# Patient Record
Sex: FEMALE | Race: WHITE | NOT HISPANIC OR LATINO | Employment: OTHER | URBAN - METROPOLITAN AREA
[De-identification: names, ages, dates, MRNs, and addresses within clinical notes are randomized per-mention and may not be internally consistent; named-entity substitution may affect disease eponyms.]

---

## 2017-05-09 ENCOUNTER — HOSPITAL ENCOUNTER (OUTPATIENT)
Dept: SLEEP MEDICINE | Facility: HOSPITAL | Age: 72
Discharge: HOME | End: 2017-05-09
Attending: FAMILY MEDICINE
Payer: COMMERCIAL

## 2017-05-09 PROCEDURE — 95806 SLEEP STUDY UNATT&RESP EFFT: CPT

## 2017-11-16 ENCOUNTER — TELEPHONE (OUTPATIENT)
Dept: SURGERY | Facility: CLINIC | Age: 72
End: 2017-11-16

## 2017-11-16 NOTE — TELEPHONE ENCOUNTER
Received call from [facility] regarding discs. They will mail today. Will send to radiology when they arrive.

## 2017-11-16 NOTE — TELEPHONE ENCOUNTER
Patient returned my call, discussed referral from [facility] and our abnormal mammogram protocol. Patient would like mammograms reviewed. Patient states the mammogram office told her they'd mail her images if she needed them to, she will call them and ask them to call Gabriel to get address. Explained that once we receive the images our radiologist will review the discs and be in contact with their recommendations. Also explained that I will call patient to update her once the disc is received and sent for review. Verbalized understanding of all information.

## 2017-11-21 ENCOUNTER — TELEPHONE (OUTPATIENT)
Dept: HEMATOLOGY/ONCOLOGY | Facility: CLINIC | Age: 72
End: 2017-11-21

## 2017-11-21 ENCOUNTER — HOSPITAL ENCOUNTER (OUTPATIENT)
Dept: RADIOLOGY | Facility: HOSPITAL | Age: 72
Discharge: HOME | End: 2017-11-21
Attending: NURSE PRACTITIONER

## 2017-11-21 NOTE — TELEPHONE ENCOUNTER
Called patient to let her know I received her images on disc, will bring them to radiology today for review. Also provided patient with insurance line information so she can get insurance added to system.

## 2017-11-22 ENCOUNTER — TELEPHONE (OUTPATIENT)
Dept: RADIOLOGY | Facility: HOSPITAL | Age: 72
End: 2017-11-22

## 2017-11-22 NOTE — TELEPHONE ENCOUNTER
Spoke with patient. Reviewed breast biopsy procedure and reviewed instructions for breast biopsy. Patient expressed understanding and all questions were answered. Provided patient with my phone number to call for any further concerns or questions.   Patient scheduled breast biopsy at the [facility] on 12/6/17

## 2017-12-06 ENCOUNTER — OFFICE VISIT (OUTPATIENT)
Dept: SURGERY | Facility: CLINIC | Age: 72
End: 2017-12-06
Payer: MEDICARE

## 2017-12-06 ENCOUNTER — HOSPITAL ENCOUNTER (OUTPATIENT)
Dept: RADIOLOGY | Facility: HOSPITAL | Age: 72
Discharge: HOME | End: 2017-12-06
Attending: SURGERY
Payer: MEDICARE

## 2017-12-06 VITALS
BODY MASS INDEX: 27.12 KG/M2 | HEIGHT: 59 IN | HEART RATE: 76 BPM | SYSTOLIC BLOOD PRESSURE: 177 MMHG | DIASTOLIC BLOOD PRESSURE: 79 MMHG | TEMPERATURE: 98 F | WEIGHT: 134.5 LBS

## 2017-12-06 DIAGNOSIS — R92.8 ABNORMAL MAMMOGRAM: Primary | ICD-10-CM

## 2017-12-06 DIAGNOSIS — R92.8 ABNORMAL MAMMOGRAM OF RIGHT BREAST: Primary | ICD-10-CM

## 2017-12-06 DIAGNOSIS — N63.0 BREAST MASS: ICD-10-CM

## 2017-12-06 DIAGNOSIS — R92.8 ABNORMAL MAMMOGRAM OF RIGHT BREAST: ICD-10-CM

## 2017-12-06 DIAGNOSIS — R92.8 ABNORMAL MAMMOGRAM: ICD-10-CM

## 2017-12-06 PROCEDURE — 88342 IMHCHEM/IMCYTCHM 1ST ANTB: CPT | Mod: 26,59,, | Performed by: PATHOLOGY

## 2017-12-06 PROCEDURE — 99999 PR PBB SHADOW E&M-EST. PATIENT-LVL III: CPT | Mod: PBBFAC,,, | Performed by: SURGERY

## 2017-12-06 PROCEDURE — 76642 ULTRASOUND BREAST LIMITED: CPT | Mod: TC,PO,RT

## 2017-12-06 PROCEDURE — 99203 OFFICE O/P NEW LOW 30 MIN: CPT | Mod: S$GLB,,, | Performed by: SURGERY

## 2017-12-06 PROCEDURE — 76642 ULTRASOUND BREAST LIMITED: CPT | Mod: 26,RT,, | Performed by: STUDENT IN AN ORGANIZED HEALTH CARE EDUCATION/TRAINING PROGRAM

## 2017-12-06 PROCEDURE — 77065 DX MAMMO INCL CAD UNI: CPT | Mod: TC,PO,RT

## 2017-12-06 PROCEDURE — 88360 TUMOR IMMUNOHISTOCHEM/MANUAL: CPT | Mod: 26,,, | Performed by: PATHOLOGY

## 2017-12-06 PROCEDURE — 38505 NEEDLE BIOPSY LYMPH NODES: CPT | Mod: ,,, | Performed by: STUDENT IN AN ORGANIZED HEALTH CARE EDUCATION/TRAINING PROGRAM

## 2017-12-06 PROCEDURE — A4648 IMPLANTABLE TISSUE MARKER: HCPCS | Mod: PO

## 2017-12-06 PROCEDURE — 88360 TUMOR IMMUNOHISTOCHEM/MANUAL: CPT | Performed by: PATHOLOGY

## 2017-12-06 PROCEDURE — 19081 BX BREAST 1ST LESION STRTCTC: CPT | Mod: 26,RT,, | Performed by: STUDENT IN AN ORGANIZED HEALTH CARE EDUCATION/TRAINING PROGRAM

## 2017-12-06 PROCEDURE — 88305 TISSUE EXAM BY PATHOLOGIST: CPT | Performed by: PATHOLOGY

## 2017-12-06 PROCEDURE — 27201068 US BIOPSY LYMPH NODE AXILLA: Mod: PO

## 2017-12-06 PROCEDURE — 77065 DX MAMMO INCL CAD UNI: CPT | Mod: 26,RT,, | Performed by: STUDENT IN AN ORGANIZED HEALTH CARE EDUCATION/TRAINING PROGRAM

## 2017-12-06 PROCEDURE — 88305 TISSUE EXAM BY PATHOLOGIST: CPT | Mod: 26,,, | Performed by: PATHOLOGY

## 2017-12-06 RX ORDER — TRAZODONE HYDROCHLORIDE 100 MG/1
1 TABLET ORAL NIGHTLY
COMMUNITY
Start: 2017-11-04 | End: 2018-02-07

## 2017-12-06 RX ORDER — CLONAZEPAM 2 MG/1
1 TABLET ORAL NIGHTLY
COMMUNITY
Start: 2017-11-29 | End: 2018-02-07

## 2017-12-06 RX ORDER — LEVOTHYROXINE SODIUM 112 UG/1
1 TABLET ORAL DAILY
COMMUNITY
Start: 2017-10-20 | End: 2020-08-03

## 2017-12-06 RX ORDER — LOSARTAN POTASSIUM 100 MG/1
1 TABLET ORAL NIGHTLY
COMMUNITY
Start: 2017-10-17 | End: 2020-08-03

## 2017-12-06 RX ORDER — PAROXETINE HYDROCHLORIDE 40 MG/1
1 TABLET, FILM COATED ORAL NIGHTLY
COMMUNITY
Start: 2017-10-20 | End: 2018-02-07

## 2017-12-06 NOTE — PROGRESS NOTES
Breast Surgery  [facility]  Department of Surgery      REFERRING PROVIDER: Aaareferral Self  No address on file    Chief Complaint: Consult (CBE prior to core bx)      Subjective:      Patient ID: Leigh Townsend is a 72 y.o. female who presents for clinical breast exam prior to scheduled biopsy.  She had an outside mammogram with new calcifications in a segmental distribution in the right breast upper outer quadrantthat are suspicious, BIRADS IV.  Her films have been reviewed and is scheduled for additional imaging today.    Patient does not routinely do self breast exams.  Patient has not noted a change on breast exam.  Patient denies nipple discharge. Patient admits to to previous breast biopsy, excisional biopsy x4, all benign. Patient denies a personal history of breast cancer.    GYN History:  Age of menarche was 13. Age of menopause was early 50s.  Patient denies hormonal therapy. Patient is . Age of first live birth was 21s.     Past Medical History:   Diagnosis Date    Depression     Hypertension     Hypothyroid      Past Surgical History:   Procedure Laterality Date    BREAST BIOPSY      4 excisional biopsies     SECTION       No current outpatient prescriptions on file prior to visit.     No current facility-administered medications on file prior to visit.      Social History     Social History    Marital status:      Spouse name: N/A    Number of children: N/A    Years of education: N/A     Occupational History    Not on file.     Social History Main Topics    Smoking status: Current Every Day Smoker     Packs/day: 0.50    Smokeless tobacco: Never Used    Alcohol use No    Drug use: Unknown    Sexual activity: Not on file     Other Topics Concern    Not on file     Social History Narrative    No narrative on file     Family History   Problem Relation Age of Onset    Cancer Paternal Grandmother      throat cancer    Cancer Paternal Grandfather      prostate  "cancer        Review of Systems   Constitutional: Negative for appetite change, chills, fever and unexpected weight change.   HENT: Negative for facial swelling, postnasal drip and sore throat.    Eyes: Negative for redness and itching.   Respiratory: Negative for chest tightness and shortness of breath.    Cardiovascular: Negative for chest pain and palpitations.   Gastrointestinal: Negative for blood in stool, diarrhea, nausea and vomiting.   Genitourinary: Negative for difficulty urinating and dysuria.   Musculoskeletal: Negative for arthralgias and joint swelling.   Skin: Negative for rash and wound.   Neurological: Negative for dizziness and syncope.   Hematological: Negative for adenopathy.   Psychiatric/Behavioral: Negative for agitation. The patient is not nervous/anxious.      Objective:   BP (!) 177/79 (BP Location: Right arm, Patient Position: Sitting, BP Method: Medium (Automatic))   Pulse 76   Temp 97.6 °F (36.4 °C) (Oral)   Ht 4' 11" (1.499 m)   Wt 61 kg (134 lb 7.7 oz)   BMI 27.16 kg/m²     Physical Exam   Constitutional: She appears well-developed and well-nourished.   HENT:   Head: Normocephalic.   Eyes: No scleral icterus.   Neck: Neck supple. No tracheal deviation present.   Cardiovascular: Normal rate and regular rhythm.    Pulmonary/Chest: Breath sounds normal. No respiratory distress. Right breast exhibits no inverted nipple, no mass, no nipple discharge and no skin change. Left breast exhibits no inverted nipple, no mass, no nipple discharge and no skin change.       Abdominal: Soft. She exhibits no mass. There is no tenderness.   Musculoskeletal: She exhibits no edema.   Lymphadenopathy:     She has no cervical adenopathy.   Neurological: She is alert.   Skin: No rash noted. No erythema.     Psychiatric: She has a normal mood and affect.       Radiology review: Images personally reviewed by me in the clinic.   Mammogram:  Findings:     Bilateral screening mammogram 10/25/17: The breasts " are heterogeneously dense, which may obscure small masses. There are indeterminate calcifications in the right breast. There are stable presumed post-surgical changes in both breasts with radiopaque linear scar markers on the overlying skin. No suspicious finding in the left breast.      Right breast mammogram 11/9/17: Additional evaluation with spot magnification mammography was performed for the right breast calcifications. In the right breast upper outer quadrant, fine linear and fine linear branching calcifications are present, spanning the anterior to middle depths and extending to within 1 cm of the nipple. The calcifications are segmentally distributed, measuring 6.5 x 4.7 x 4.4 cm (APxTVxCC).      Impression:  1) Right breast upper outer quadrant segmental fine linear branching calcifications spanning the anterior to middle depths and extending to within 1 cm of the nipple. Greatest linear extent of involvement is 6.5 cm. Assessment: 4C - Suspicious finding. Biopsy is recommended. Prior to biopsy, limited right breast ultrasound (including assessment of the right axilla) is recommended.     2) No suspicious finding in the left breast.     BI-RADS Category:   Overall: 4C - High Suspicion for Malignancy     Recommendation:  Right breast limited ultrasound, including assessment of the right axilla.     Single site right breast biopsy of the most suspicious area of calcifications.  Assessment:       Abnormal mammogram  Plan:     We discussed the need for biopsy today of the new calcifications in the R breast.  I explained that I do feel a subtle abnormality of the L breast that we will also image.  She has had needle biopsies before and is ready to proceed.  I explained the possible biopsy results and potential need for further intervention depending on results.  I told her I was suspicious of the imaging findings and she responded that it would be fine to just take her breast if it needed to go.  Will call with  biopsy results.   All her questions were answered.    Total time spent with the patient: 30 minutes.  20 minutes of face to face consultation and 10 minutes of chart review and coordination of care.

## 2017-12-11 ENCOUNTER — TELEPHONE (OUTPATIENT)
Dept: SURGERY | Facility: CLINIC | Age: 72
End: 2017-12-11

## 2017-12-11 NOTE — TELEPHONE ENCOUNTER
Patient returned my call, we discussed her biopsy results. Explained to patient that biopsy came back with DCIS and a negative lymph node. We discussed what these results mean, and that the next step is to discuss surgery. Patient was very happy with Dr. Mesa and would like to stay with her. Scheduled with Dr. Msea for follow up discussion on Wednesday at 2:15. Reviewed location of [facility] with patient, encouraged her to call if she has any questions or concerns or needs to reschedule. Verbalized understanding of all information.

## 2017-12-11 NOTE — TELEPHONE ENCOUNTER
Called patient to discuss biopsy results. Left voicemail with my callback number requesting return call at earliest convenience

## 2017-12-13 ENCOUNTER — OFFICE VISIT (OUTPATIENT)
Dept: SURGERY | Facility: CLINIC | Age: 72
End: 2017-12-13
Payer: MEDICARE

## 2017-12-13 ENCOUNTER — DOCUMENTATION ONLY (OUTPATIENT)
Dept: SURGERY | Facility: CLINIC | Age: 72
End: 2017-12-13

## 2017-12-13 VITALS
SYSTOLIC BLOOD PRESSURE: 159 MMHG | WEIGHT: 134.5 LBS | HEIGHT: 59 IN | BODY MASS INDEX: 27.12 KG/M2 | TEMPERATURE: 98 F | DIASTOLIC BLOOD PRESSURE: 71 MMHG | HEART RATE: 92 BPM

## 2017-12-13 DIAGNOSIS — Z17.0 MALIGNANT NEOPLASM OF OVERLAPPING SITES OF RIGHT BREAST IN FEMALE, ESTROGEN RECEPTOR POSITIVE: Primary | ICD-10-CM

## 2017-12-13 DIAGNOSIS — C50.811 MALIGNANT NEOPLASM OF OVERLAPPING SITES OF RIGHT BREAST IN FEMALE, ESTROGEN RECEPTOR POSITIVE: Primary | ICD-10-CM

## 2017-12-13 PROCEDURE — 99214 OFFICE O/P EST MOD 30 MIN: CPT | Mod: S$GLB,,, | Performed by: SURGERY

## 2017-12-13 PROCEDURE — 99999 PR PBB SHADOW E&M-EST. PATIENT-LVL III: CPT | Mod: PBBFAC,,, | Performed by: SURGERY

## 2017-12-13 NOTE — PROGRESS NOTES
Nurse Navigator Note:     Met with patient during her consult with Dr. Mesa. Patient and I reviewed the information she discussed with Dr. Mesa, including treatment options, diagnosis, and future plans for workup. Patient and I went through the new patient binder, explained some of the information and why it is provided.     Also offered patient consults with our other specialty clinics: Sarah Gibson for physical therapy evaluation, Dr. Varghese for psychological support, and Minerva Staley for nutritional counseling. Explained to patient that all of these support services are completely optional. Discussed that physical therapy is the only service that is recommended pre-op specifically, everything else can be requested at a later time. Patient was given a copy of her appointments, Dr. Mesa's card, and my card. Encouraged her to call me if she has any questions or concerns or would like to schedule any additional appointments. Verbalized understanding of all information.

## 2017-12-14 DIAGNOSIS — C50.811 MALIGNANT NEOPLASM OF OVERLAPPING SITES OF RIGHT BREAST IN FEMALE, ESTROGEN RECEPTOR NEGATIVE: Primary | ICD-10-CM

## 2017-12-14 DIAGNOSIS — Z17.1 MALIGNANT NEOPLASM OF OVERLAPPING SITES OF RIGHT BREAST IN FEMALE, ESTROGEN RECEPTOR NEGATIVE: Primary | ICD-10-CM

## 2017-12-19 NOTE — PROGRESS NOTES
Breast Surgery  [facility]  Department of Surgery      REFERRING PROVIDER: No referring provider defined for this encounter.    Chief Complaint: Consult (New DCIS)      Subjective:      Patient ID: Leigh Townsend is a 72 y.o. female who returns after biopsies with findings of DCIS with microinvasive disease in 2 separate quadrants of the breast connected by suspicious calcifications.    She initially presented for clinical breast exam prior to scheduled biopsy.  She had an outside mammogram with new calcifications in a segmental distribution in the right breast upper outer quadrantthat are suspicious, BIRADS IV.  Her films have been reviewed and is scheduled for additional imaging today.    Patient does not routinely do self breast exams.  Patient has not noted a change on breast exam.  Patient denies nipple discharge. Patient admits to to previous breast biopsy, excisional biopsy x4, all benign. Patient denies a personal history of breast cancer.    GYN History:  Age of menarche was 13. Age of menopause was early 50s.  Patient denies hormonal therapy. Patient is . Age of first live birth was 20s.     Past Medical History:   Diagnosis Date    Depression     Hypertension     Hypothyroid      Past Surgical History:   Procedure Laterality Date    BREAST BIOPSY      4 excisional biopsies     SECTION       Current Outpatient Prescriptions on File Prior to Visit   Medication Sig Dispense Refill    clonazePAM (KLONOPIN) 2 MG Tab Take 1 tablet by mouth every evening.      levothyroxine (SYNTHROID) 112 MCG tablet Take 1 tablet by mouth once daily.      losartan (COZAAR) 100 MG tablet Take 1 tablet by mouth once daily.      paroxetine (PAXIL) 40 MG tablet Take 1 tablet by mouth once daily.      traZODone (DESYREL) 100 MG tablet Take 1 tablet by mouth every evening.       No current facility-administered medications on file prior to visit.      Social History     Social History    Marital status:  "     Spouse name: N/A    Number of children: N/A    Years of education: N/A     Occupational History    Not on file.     Social History Main Topics    Smoking status: Current Every Day Smoker     Packs/day: 0.50    Smokeless tobacco: Never Used    Alcohol use No    Drug use: Unknown    Sexual activity: Not on file     Other Topics Concern    Not on file     Social History Narrative    No narrative on file     Family History   Problem Relation Age of Onset    Cancer Paternal Grandmother      throat cancer    Cancer Paternal Grandfather      prostate cancer        Review of Systems   Constitutional: Negative for appetite change, chills, fever and unexpected weight change.   HENT: Negative for facial swelling, postnasal drip and sore throat.    Eyes: Negative for redness and itching.   Respiratory: Negative for chest tightness and shortness of breath.    Cardiovascular: Negative for chest pain and palpitations.   Gastrointestinal: Negative for blood in stool, diarrhea, nausea and vomiting.   Genitourinary: Negative for difficulty urinating and dysuria.   Musculoskeletal: Negative for arthralgias and joint swelling.   Skin: Negative for rash and wound.   Neurological: Negative for dizziness and syncope.   Hematological: Negative for adenopathy.   Psychiatric/Behavioral: Negative for agitation. The patient is not nervous/anxious.      Objective:   BP (!) 159/71 (BP Location: Left arm, Patient Position: Sitting, BP Method: Medium (Automatic))   Pulse 92   Temp 97.8 °F (36.6 °C) (Oral)   Ht 4' 11" (1.499 m)   Wt 61 kg (134 lb 8 oz)   LMP  (LMP Unknown)   BMI 27.17 kg/m²     Physical Exam   Constitutional: She appears well-developed and well-nourished.   HENT:   Head: Normocephalic.   Eyes: No scleral icterus.   Neck: Neck supple. No tracheal deviation present.   Cardiovascular: Normal rate and regular rhythm.    Pulmonary/Chest: Breath sounds normal. No respiratory distress. Right breast exhibits " no inverted nipple, no mass, no nipple discharge and no skin change. Left breast exhibits no inverted nipple, no mass, no nipple discharge and no skin change.       Abdominal: Soft. She exhibits no mass. There is no tenderness.   Musculoskeletal: She exhibits no edema.   Lymphadenopathy:     She has no cervical adenopathy.   Neurological: She is alert.   Skin: No rash noted. No erythema.     Psychiatric: She has a normal mood and affect.       Radiology review: Images personally reviewed by me in the clinic.   Mammogram:  Findings:     Bilateral screening mammogram 10/25/17: The breasts are heterogeneously dense, which may obscure small masses. There are indeterminate calcifications in the right breast. There are stable presumed post-surgical changes in both breasts with radiopaque linear scar markers on the overlying skin. No suspicious finding in the left breast.      Right breast mammogram 11/9/17: Additional evaluation with spot magnification mammography was performed for the right breast calcifications. In the right breast upper outer quadrant, fine linear and fine linear branching calcifications are present, spanning the anterior to middle depths and extending to within 1 cm of the nipple. The calcifications are segmentally distributed, measuring 6.5 x 4.7 x 4.4 cm (APxTVxCC).      Impression:  1) Right breast upper outer quadrant segmental fine linear branching calcifications spanning the anterior to middle depths and extending to within 1 cm of the nipple. Greatest linear extent of involvement is 6.5 cm. Assessment: 4C - Suspicious finding. Biopsy is recommended. Prior to biopsy, limited right breast ultrasound (including assessment of the right axilla) is recommended.     2) No suspicious finding in the left breast.     BI-RADS Category:   Overall: 4C - High Suspicion for Malignancy     Recommendation:  Right breast limited ultrasound, including assessment of the right axilla.     Single site right breast  biopsy of the most suspicious area of calcifications.      PAFINAL PATHOLOGIC DIAGNOSIS  1. CORE BIOPSIES OF RIGHT BREAST:  DUCTAL CARCINOMA IN SITU WITH MICROINVASION SEE DESCRIPTION  HORMONE RECEPTOR STUDIES:  No nuclear estrogen receptor, nuclear progesterone receptor, or HER-2 positivity is identified in the small focus  interpreted as invasive carcinoma. The positive and negative controls stained appropriately. Because this area is  so small, it is my recommendation that these studies be repeated if additional infiltrating carcinoma would be found  in a subsequent resection specimen.  2. CORE BIOPSIES OF RIGHT BREAST:  DUCTAL CARCINOMA IN SITU  3. CORE BIOPSIES OF AXILLARY LYMPH NODE:  NO METASTATIC CARCINOMA IDENTIFIED  [id number]  Diagnosed by: Jeffery Noriega M.D.  (Electronically Signed: 2017-12-11 09:22:32)  Microscopic Examination  1. There are multiple areas of high-grade ductal carcinoma in situ. Microcalcifications are present. In addition there is  a 1 mm cluster with irregular outlines. It is my interpretation that this focus reflects an infiltrating carcinoma. This area  does not have a basal layer with the p63 stain. In addition there are some very small adjacent clusters in a pattern of  infiltrating carcinoma.  3. No metastatic carcinoma is identified in the multiple levels of these core biopsies of lymphoid tissue area.TH:      Assessment:       DCIS with microinvasion     Options for management were discussed with the patient and her family. We reviewed the existing data noting the equivalency of breast conserving surgery with radiation therapy and mastectomy. We also reviewed the guidelines of the National Comprehensive Cancer Network for Stage IA breast carcinoma. We discussed the need for lumpectomy margins to be negative for carcinoma, the necessity for postoperative radiation therapy after breast conservation in most cases, the possibility of a failed or false negative sentinel lymph  node biopsy and the potential need for complete lymphadenectomy for a failed or positive sentinel lymph node biopsy were fully discussed. In the setting of mastectomy, delayed or immediate reconstruction options are available and were discussed.     In the setting of lumpectomy, radiation therapy would be recommended majority of the time.  The duration and treatment side effects were discussed with the patient.  This will coordinated with the radiation oncologist pending final pathology.    We also discussed the role of systemic therapy in the treatment of early stage breast cancer.  We discussed that this is based on tumor biology and bandar status and will be determined based on final pathology.  We discussed that if the cancer is hormone positive, endocrine therapy would be recommended in most cases and its use can reduce the risk of recurrence as well as improve survival. Side effects of treatment were briefly discussed. We also discussed the potential role for chemotherapy based on a number of factors such as tumor phenotype (ER+ vs. triple negative vs. [id number]+) and this would be determined in coordination with the medical oncologist.    The patient, in consultation with her family, has elected to proceed with left total mastectomy and sentinel lymph node biopsy. The operative risks of bleeding, infection, recurrence, scarring, and anesthetic complications and the possibility of requiring further surgery were all noted and informed consent obtained.    Surgery scheduled. Follow-up in clinic roughly 14 days after surgery.     Patient was educated on breast cancer, receptors, wire localization lumpectomy, mastectomy, sentinel lymph node mapping and biopsy, axillary lymph node dissection, reconstruction, breast prosthesis with post-mastectomy bra and radiation therapy. Patient was given patient information binder [facility].  All her questions were answered.    Total time  spent with the patient: 45 minutes.  30 minutes of face to face consultation and 15 minutes of chart review and coordination of care.     All her questions were answered.    Total time spent with the patient: 30 minutes.  20 minutes of face to face consultation and 10 minutes of chart review and coordination of care.

## 2017-12-20 PROBLEM — C50.812 MALIGNANT NEOPLASM OF OVERLAPPING SITES OF LEFT BREAST IN FEMALE, ESTROGEN RECEPTOR POSITIVE: Status: ACTIVE | Noted: 2017-12-20

## 2017-12-20 PROBLEM — Z17.0 MALIGNANT NEOPLASM OF OVERLAPPING SITES OF LEFT BREAST IN FEMALE, ESTROGEN RECEPTOR POSITIVE: Status: ACTIVE | Noted: 2017-12-20

## 2017-12-26 DIAGNOSIS — C50.811 MALIGNANT NEOPLASM OF OVERLAPPING SITES OF RIGHT BREAST IN FEMALE, ESTROGEN RECEPTOR POSITIVE: Primary | ICD-10-CM

## 2017-12-26 DIAGNOSIS — Z17.0 MALIGNANT NEOPLASM OF OVERLAPPING SITES OF RIGHT BREAST IN FEMALE, ESTROGEN RECEPTOR POSITIVE: Primary | ICD-10-CM

## 2018-01-09 DIAGNOSIS — C50.911 MALIGNANT NEOPLASM OF RIGHT FEMALE BREAST, UNSPECIFIED ESTROGEN RECEPTOR STATUS, UNSPECIFIED SITE OF BREAST: Primary | ICD-10-CM

## 2018-01-10 ENCOUNTER — TELEPHONE (OUTPATIENT)
Dept: SURGERY | Facility: CLINIC | Age: 73
End: 2018-01-10

## 2018-01-10 PROBLEM — C50.811 MALIGNANT NEOPLASM OF OVERLAPPING SITES OF RIGHT BREAST IN FEMALE, ESTROGEN RECEPTOR POSITIVE: Status: ACTIVE | Noted: 2017-12-20

## 2018-01-10 NOTE — TELEPHONE ENCOUNTER
----- Message from Jagdish Cannon sent at 1/10/2018  2:08 PM CST -----  Remy Warren is returning missed phone call regarding surgery time. Please call pt at [phone]

## 2018-01-10 NOTE — TELEPHONE ENCOUNTER
Left message for pt to call back regarding surgery arrival time 1/11/18. Will await call back from pt.

## 2018-01-11 ENCOUNTER — ANESTHESIA (OUTPATIENT)
Dept: SURGERY | Facility: HOSPITAL | Age: 73
End: 2018-01-11
Payer: MEDICARE

## 2018-01-11 ENCOUNTER — SURGERY (OUTPATIENT)
Age: 73
End: 2018-01-11

## 2018-01-11 ENCOUNTER — HOSPITAL ENCOUNTER (OUTPATIENT)
Dept: RADIOLOGY | Facility: HOSPITAL | Age: 73
Discharge: HOME | End: 2018-01-11
Attending: SURGERY | Admitting: SURGERY
Payer: MEDICARE

## 2018-01-11 ENCOUNTER — HOSPITAL ENCOUNTER (OUTPATIENT)
Facility: HOSPITAL | Age: 73
Discharge: HOME | End: 2018-01-12
Attending: SURGERY | Admitting: SURGERY
Payer: MEDICARE

## 2018-01-11 ENCOUNTER — ANESTHESIA EVENT (OUTPATIENT)
Dept: SURGERY | Facility: HOSPITAL | Age: 73
End: 2018-01-11
Payer: MEDICARE

## 2018-01-11 DIAGNOSIS — C50.911 BREAST CANCER, RIGHT: ICD-10-CM

## 2018-01-11 DIAGNOSIS — R92.8 ABNORMAL MAMMOGRAM: ICD-10-CM

## 2018-01-11 DIAGNOSIS — C50.911 MALIGNANT NEOPLASM OF RIGHT FEMALE BREAST, UNSPECIFIED ESTROGEN RECEPTOR STATUS, UNSPECIFIED SITE OF BREAST: ICD-10-CM

## 2018-01-11 PROCEDURE — 64461 PVB THORACIC SINGLE INJ SITE: CPT | Mod: 59,RT,, | Performed by: ANESTHESIOLOGY

## 2018-01-11 PROCEDURE — 38792 RA TRACER ID OF SENTINL NODE: CPT | Mod: TC

## 2018-01-11 PROCEDURE — 88342 IMHCHEM/IMCYTCHM 1ST ANTB: CPT | Mod: 26,,,

## 2018-01-11 PROCEDURE — 37000008 HC ANESTHESIA 1ST 15 MINUTES: Performed by: SURGERY

## 2018-01-11 PROCEDURE — 88331 PATH CONSLTJ SURG 1 BLK 1SPC: CPT | Mod: 26,,,

## 2018-01-11 PROCEDURE — 25000003 PHARM REV CODE 250: Performed by: STUDENT IN AN ORGANIZED HEALTH CARE EDUCATION/TRAINING PROGRAM

## 2018-01-11 PROCEDURE — 88307 TISSUE EXAM BY PATHOLOGIST: CPT

## 2018-01-11 PROCEDURE — 71000033 HC RECOVERY, INTIAL HOUR: Performed by: SURGERY

## 2018-01-11 PROCEDURE — 63600175 PHARM REV CODE 636 W HCPCS: Performed by: NURSE ANESTHETIST, CERTIFIED REGISTERED

## 2018-01-11 PROCEDURE — 88342 IMHCHEM/IMCYTCHM 1ST ANTB: CPT | Mod: 59

## 2018-01-11 PROCEDURE — 63600175 PHARM REV CODE 636 W HCPCS: Performed by: ANESTHESIOLOGY

## 2018-01-11 PROCEDURE — 94761 N-INVAS EAR/PLS OXIMETRY MLT: CPT

## 2018-01-11 PROCEDURE — 27200651 HC AIRWAY, LMA: Performed by: NURSE ANESTHETIST, CERTIFIED REGISTERED

## 2018-01-11 PROCEDURE — 27000221 HC OXYGEN, UP TO 24 HOURS

## 2018-01-11 PROCEDURE — 37000009 HC ANESTHESIA EA ADD 15 MINS: Performed by: SURGERY

## 2018-01-11 PROCEDURE — 19303 MAST SIMPLE COMPLETE: CPT | Mod: RT,,, | Performed by: SURGERY

## 2018-01-11 PROCEDURE — 36000707: Performed by: SURGERY

## 2018-01-11 PROCEDURE — 88341 IMHCHEM/IMCYTCHM EA ADD ANTB: CPT | Mod: 26,,,

## 2018-01-11 PROCEDURE — 36000706: Performed by: SURGERY

## 2018-01-11 PROCEDURE — 63600175 PHARM REV CODE 636 W HCPCS: Performed by: STUDENT IN AN ORGANIZED HEALTH CARE EDUCATION/TRAINING PROGRAM

## 2018-01-11 PROCEDURE — D9220A PRA ANESTHESIA: Mod: ANES,,, | Performed by: ANESTHESIOLOGY

## 2018-01-11 PROCEDURE — D9220A PRA ANESTHESIA: Mod: CRNA,,, | Performed by: NURSE ANESTHETIST, CERTIFIED REGISTERED

## 2018-01-11 PROCEDURE — 38525 BIOPSY/REMOVAL LYMPH NODES: CPT | Mod: 51,RT,, | Performed by: SURGERY

## 2018-01-11 PROCEDURE — 38792 RA TRACER ID OF SENTINL NODE: CPT | Mod: 26,RT,, | Performed by: RADIOLOGY

## 2018-01-11 PROCEDURE — 25000003 PHARM REV CODE 250: Performed by: NURSE ANESTHETIST, CERTIFIED REGISTERED

## 2018-01-11 PROCEDURE — 71000039 HC RECOVERY, EACH ADD'L HOUR: Performed by: SURGERY

## 2018-01-11 PROCEDURE — 88307 TISSUE EXAM BY PATHOLOGIST: CPT | Mod: 26,,,

## 2018-01-11 PROCEDURE — 64520 N BLOCK LUMBAR/THORACIC: CPT | Performed by: ANESTHESIOLOGY

## 2018-01-11 RX ORDER — TRAZODONE HYDROCHLORIDE 100 MG/1
100 TABLET ORAL NIGHTLY
Status: DISCONTINUED | OUTPATIENT
Start: 2018-01-11 | End: 2018-01-12 | Stop reason: HOSPADM

## 2018-01-11 RX ORDER — OXYCODONE AND ACETAMINOPHEN 10; 325 MG/1; MG/1
1 TABLET ORAL EVERY 4 HOURS PRN
Status: DISCONTINUED | OUTPATIENT
Start: 2018-01-11 | End: 2018-01-12 | Stop reason: HOSPADM

## 2018-01-11 RX ORDER — FENTANYL CITRATE 50 UG/ML
25 INJECTION, SOLUTION INTRAMUSCULAR; INTRAVENOUS EVERY 5 MIN PRN
Status: DISCONTINUED | OUTPATIENT
Start: 2018-01-11 | End: 2018-01-11

## 2018-01-11 RX ORDER — ACETAMINOPHEN 10 MG/ML
INJECTION, SOLUTION INTRAVENOUS
Status: DISCONTINUED | OUTPATIENT
Start: 2018-01-11 | End: 2018-01-11

## 2018-01-11 RX ORDER — DIPHENHYDRAMINE HYDROCHLORIDE 50 MG/ML
25 INJECTION INTRAMUSCULAR; INTRAVENOUS EVERY 4 HOURS PRN
Status: DISCONTINUED | OUTPATIENT
Start: 2018-01-11 | End: 2018-01-12 | Stop reason: HOSPADM

## 2018-01-11 RX ORDER — SODIUM CHLORIDE 9 MG/ML
INJECTION, SOLUTION INTRAVENOUS CONTINUOUS
Status: DISCONTINUED | OUTPATIENT
Start: 2018-01-11 | End: 2018-01-12 | Stop reason: HOSPADM

## 2018-01-11 RX ORDER — CLONAZEPAM 1 MG/1
2 TABLET ORAL NIGHTLY
Status: DISCONTINUED | OUTPATIENT
Start: 2018-01-11 | End: 2018-01-12 | Stop reason: HOSPADM

## 2018-01-11 RX ORDER — PROPOFOL 10 MG/ML
VIAL (ML) INTRAVENOUS
Status: DISCONTINUED | OUTPATIENT
Start: 2018-01-11 | End: 2018-01-11

## 2018-01-11 RX ORDER — PHENYLEPHRINE HYDROCHLORIDE 10 MG/ML
INJECTION INTRAVENOUS
Status: DISCONTINUED | OUTPATIENT
Start: 2018-01-11 | End: 2018-01-11

## 2018-01-11 RX ORDER — SODIUM CHLORIDE 9 MG/ML
INJECTION, SOLUTION INTRAVENOUS CONTINUOUS
Status: DISCONTINUED | OUTPATIENT
Start: 2018-01-11 | End: 2018-01-11

## 2018-01-11 RX ORDER — OXYCODONE AND ACETAMINOPHEN 5; 325 MG/1; MG/1
1 TABLET ORAL EVERY 4 HOURS PRN
Status: DISCONTINUED | OUTPATIENT
Start: 2018-01-11 | End: 2018-01-12 | Stop reason: HOSPADM

## 2018-01-11 RX ORDER — ONDANSETRON 8 MG/1
8 TABLET, ORALLY DISINTEGRATING ORAL EVERY 8 HOURS PRN
Status: DISCONTINUED | OUTPATIENT
Start: 2018-01-11 | End: 2018-01-12 | Stop reason: HOSPADM

## 2018-01-11 RX ORDER — LEVOTHYROXINE SODIUM 112 UG/1
112 TABLET ORAL DAILY
Status: DISCONTINUED | OUTPATIENT
Start: 2018-01-12 | End: 2018-01-12 | Stop reason: HOSPADM

## 2018-01-11 RX ORDER — SODIUM CHLORIDE 0.9 % (FLUSH) 0.9 %
3 SYRINGE (ML) INJECTION
Status: DISCONTINUED | OUTPATIENT
Start: 2018-01-11 | End: 2018-01-11

## 2018-01-11 RX ORDER — ONDANSETRON 2 MG/ML
INJECTION INTRAMUSCULAR; INTRAVENOUS
Status: DISCONTINUED | OUTPATIENT
Start: 2018-01-11 | End: 2018-01-11

## 2018-01-11 RX ORDER — MIDAZOLAM HYDROCHLORIDE 1 MG/ML
0.5 INJECTION INTRAMUSCULAR; INTRAVENOUS EVERY 5 MIN PRN
Status: DISCONTINUED | OUTPATIENT
Start: 2018-01-11 | End: 2018-01-11

## 2018-01-11 RX ORDER — SODIUM CHLORIDE 0.9 % (FLUSH) 0.9 %
3 SYRINGE (ML) INJECTION
Status: DISCONTINUED | OUTPATIENT
Start: 2018-01-11 | End: 2018-01-12 | Stop reason: HOSPADM

## 2018-01-11 RX ORDER — PAROXETINE 10 MG/1
40 TABLET, FILM COATED ORAL NIGHTLY
Status: DISCONTINUED | OUTPATIENT
Start: 2018-01-12 | End: 2018-01-12 | Stop reason: HOSPADM

## 2018-01-11 RX ORDER — FENTANYL CITRATE 50 UG/ML
INJECTION, SOLUTION INTRAMUSCULAR; INTRAVENOUS
Status: DISCONTINUED | OUTPATIENT
Start: 2018-01-11 | End: 2018-01-11

## 2018-01-11 RX ORDER — LIDOCAINE HYDROCHLORIDE 10 MG/ML
INJECTION, SOLUTION EPIDURAL; INFILTRATION; INTRACAUDAL; PERINEURAL
Status: DISPENSED
Start: 2018-01-11 | End: 2018-01-11

## 2018-01-11 RX ORDER — CEFAZOLIN SODIUM 1 G/3ML
2 INJECTION, POWDER, FOR SOLUTION INTRAMUSCULAR; INTRAVENOUS
Status: COMPLETED | OUTPATIENT
Start: 2018-01-11 | End: 2018-01-11

## 2018-01-11 RX ORDER — LIDOCAINE HCL/PF 100 MG/5ML
SYRINGE (ML) INTRAVENOUS
Status: DISCONTINUED | OUTPATIENT
Start: 2018-01-11 | End: 2018-01-11

## 2018-01-11 RX ADMIN — FENTANYL CITRATE 50 MCG: 50 INJECTION, SOLUTION INTRAMUSCULAR; INTRAVENOUS at 09:01

## 2018-01-11 RX ADMIN — PHENYLEPHRINE HYDROCHLORIDE 100 MCG: 10 INJECTION INTRAVENOUS at 11:01

## 2018-01-11 RX ADMIN — FENTANYL CITRATE 50 MCG: 50 INJECTION, SOLUTION INTRAMUSCULAR; INTRAVENOUS at 12:01

## 2018-01-11 RX ADMIN — TRAZODONE HYDROCHLORIDE 100 MG: 100 TABLET ORAL at 10:01

## 2018-01-11 RX ADMIN — MIDAZOLAM HYDROCHLORIDE 2 MG: 1 INJECTION, SOLUTION INTRAMUSCULAR; INTRAVENOUS at 09:01

## 2018-01-11 RX ADMIN — SODIUM CHLORIDE: 0.9 INJECTION, SOLUTION INTRAVENOUS at 02:01

## 2018-01-11 RX ADMIN — FENTANYL CITRATE 50 MCG: 50 INJECTION, SOLUTION INTRAMUSCULAR; INTRAVENOUS at 10:01

## 2018-01-11 RX ADMIN — ACETAMINOPHEN 1000 MG: 10 INJECTION, SOLUTION INTRAVENOUS at 10:01

## 2018-01-11 RX ADMIN — CEFAZOLIN 2 G: 330 INJECTION, POWDER, FOR SOLUTION INTRAMUSCULAR; INTRAVENOUS at 10:01

## 2018-01-11 RX ADMIN — SODIUM CHLORIDE, SODIUM GLUCONATE, SODIUM ACETATE, POTASSIUM CHLORIDE, MAGNESIUM CHLORIDE, SODIUM PHOSPHATE, DIBASIC, AND POTASSIUM PHOSPHATE: .53; .5; .37; .037; .03; .012; .00082 INJECTION, SOLUTION INTRAVENOUS at 11:01

## 2018-01-11 RX ADMIN — CLONAZEPAM 2 MG: 1 TABLET ORAL at 10:01

## 2018-01-11 RX ADMIN — FENTANYL CITRATE 50 MCG: 50 INJECTION, SOLUTION INTRAMUSCULAR; INTRAVENOUS at 01:01

## 2018-01-11 RX ADMIN — PHENYLEPHRINE HYDROCHLORIDE 200 MCG: 10 INJECTION INTRAVENOUS at 11:01

## 2018-01-11 RX ADMIN — SODIUM CHLORIDE: 0.9 INJECTION, SOLUTION INTRAVENOUS at 08:01

## 2018-01-11 RX ADMIN — PHENYLEPHRINE HYDROCHLORIDE 100 MCG: 10 INJECTION INTRAVENOUS at 12:01

## 2018-01-11 RX ADMIN — LIDOCAINE HYDROCHLORIDE 80 MG: 20 INJECTION, SOLUTION INTRAVENOUS at 10:01

## 2018-01-11 RX ADMIN — SODIUM CHLORIDE: 0.9 INJECTION, SOLUTION INTRAVENOUS at 09:01

## 2018-01-11 RX ADMIN — ONDANSETRON 4 MG: 2 INJECTION INTRAMUSCULAR; INTRAVENOUS at 01:01

## 2018-01-11 RX ADMIN — SODIUM CHLORIDE: 0.9 INJECTION, SOLUTION INTRAVENOUS at 10:01

## 2018-01-11 RX ADMIN — PROPOFOL 200 MG: 10 INJECTION, EMULSION INTRAVENOUS at 10:01

## 2018-01-11 NOTE — TRANSFER OF CARE
Anesthesia Transfer of Care Note    Patient: Leigh Townsend    Procedure(s) Performed: Procedure(s) (LRB):  MASTECTOMY 23 hour stay (Right)  INJECTION-NODE-SENTINEL (Right)  BIOPSY-LYMPH NODE-SENTINEL (Right)    Patient location: PACU    Anesthesia Type: general and regional    Transport from OR: Transported from OR on 6-10 L/min O2 by face mask with adequate spontaneous ventilation    Post pain: adequate analgesia    Post assessment: tolerated procedure well and no apparent anesthetic complications    Post vital signs: stable    Level of consciousness: sedated and responds to stimulation    Nausea/Vomiting: no nausea/vomiting    Complications: none    Transfer of care protocol was followed      Last vitals:   Visit Vitals  /65   Pulse 90   Temp 35.9 °C (96.7 °F) (Axillary)   Resp 12   LMP  (LMP Unknown)   SpO2 (!) 94%

## 2018-01-11 NOTE — ANESTHESIA POSTPROCEDURE EVALUATION
Anesthesia Post Evaluation    Patient: Leigh Townsend    Procedure(s) Performed: Procedure(s) (LRB):  MASTECTOMY 23 hour stay (Right)  INJECTION-NODE-SENTINEL (Right)  BIOPSY-LYMPH NODE-SENTINEL (Right)    Final Anesthesia Type: general  Patient location during evaluation: PACU  Patient participation: Yes- Able to Participate  Level of consciousness: awake and alert  Post-procedure vital signs: reviewed and stable  Pain management: adequate  Airway patency: patent  PONV status at discharge: No PONV  Anesthetic complications: no      Cardiovascular status: blood pressure returned to baseline  Respiratory status: unassisted  Hydration status: euvolemic  Follow-up not needed.        Visit Vitals  BP (!) 164/77 (BP Location: Left arm, Patient Position: Lying)   Pulse 82   Temp 36.5 °C (97.7 °F) (Temporal)   Resp 19   LMP  (LMP Unknown)   SpO2 95%       Pain/Yefri Score: Pain Assessment Performed: Yes (1/11/2018  3:45 PM)  Presence of Pain: denies (1/11/2018  3:45 PM)  Pain Rating Prior to Med Admin: 0 (1/11/2018  9:50 AM)  Yefri Score: 10 (1/11/2018  3:45 PM)

## 2018-01-11 NOTE — BRIEF OP NOTE
[facility]  Surgery Department  Brief Operative Note    SUMMARY     Date of Procedure: 1/11/2018     Procedure: Procedure(s) (LRB):  MASTECTOMY TOTAL 23 hour stay (Right)  INJECTION-NODE-SENTINEL (Right)  BIOPSY-LYMPH NODE-SENTINEL (Right)     Surgeon(s) and Role:     * Pema Mesa MD - Primary     * Nori Crawley MD - Resident - Assisting        Pre-Operative Diagnosis: Malignant neoplasm of overlapping sites of right breast in female, estrogen receptor negative [C50.811, Z17.1]    Post-Operative Diagnosis: Post-Op Diagnosis Codes:     * Malignant neoplasm of overlapping sites of right breast in female, estrogen receptor negative [C50.811, Z17.1]    Anesthesia: General    Technical Procedures Used: Right mastectomy with right sentinel node biopsy    Description of the Findings of the Procedure: Right mastectomy with right sentinel node biopsy, hot, node #1 4800, node #2 1200, node #3 480    Significant Surgical Tasks Conducted by the Assistant(s), if Applicable: n/a    Complications: No    Estimated Blood Loss (EBL): minimal           Implants: * No implants in log *    Specimens:   Specimen (12h ago through future)    Start     Ordered    01/11/18 1242  Specimen to Pathology - Surgery  Once     Comments:  1.) Right Axillary [place] Lymph Node # 1, Hot, 4800-Frozen.2.) Right Axillary [place] Lymph Node # 2, Hot, 1200-Frozen.3.) Right Axillary [place] Lymph Node # 3, Hot, 480-Frozen.4.) Right breast, sort stitch superior, long stitch lateral - Permanent.      01/11/18 1241                  Condition: Good    Disposition: PACU - hemodynamically stable.    Attestation: I was present and scrubbed for the entire procedure.

## 2018-01-11 NOTE — ANESTHESIA PROCEDURE NOTES
Paravertebral Single Injection Block(s)    Patient location during procedure: pre-op   Block not for primary anesthetic.  Reason for block: at surgeon's request and post-op pain management   Post-op Pain Location: right sided breast cancer   Start time: 1/11/2018 9:48 AM  Timeout: 1/11/2018 9:47 AM   End time: 1/11/2018 10:10 AM  Staffing  Anesthesiologist: TENZIN JESUS  Resident/CRNA: RYAN ALCANTARA  Performed: resident/CRNA   Preanesthetic Checklist  Completed: patient identified, site marked, surgical consent, pre-op evaluation, timeout performed, IV checked, risks and benefits discussed and monitors and equipment checked  Peripheral Block  Patient position: sitting  Prep: ChloraPrep  Patient monitoring: heart rate, cardiac monitor, continuous pulse ox, continuous capnometry and frequent blood pressure checks  Block type: paravertebral - thoracic  Laterality: right  Injection technique: single shot  Needle  Needle type: Tuohy   Needle gauge: 17 G  Needle length: 3.5 in  Needle localization: anatomical landmarks     Assessment  Injection assessment: negative aspiration and negative parasthesia  Paresthesia pain: none  Heart rate change: no  Slow fractionated injection: yes  Medications:  Bolus administered: 15 mL of 0.5 ropivacaine  Epinephrine added: 3.75 mcg/mL (1/300,000)  Additional Notes  T4 os at 4 cm  VSS.  [facility] RN monitoring vitals throughout procedure.  Patient tolerated procedure well.

## 2018-01-11 NOTE — PLAN OF CARE
Pt AAOx4. No complaints of pain to right breast. Fluff gauze remains CDI. RODRIGO drain with moderate output. VSS. Safety maintained.

## 2018-01-11 NOTE — ANESTHESIA PREPROCEDURE EVALUATION
01/11/2018  Leigh Townsend is a 72 y.o., female.    Anesthesia Evaluation    I have reviewed the Patient Summary Reports.     I have reviewed the Medications.     Review of Systems  Anesthesia Hx:  No problems with previous Anesthesia  History of prior surgery of interest to airway management or planning: Previous anesthesia: General   Social:  Smoker, Social Alcohol Use    Hematology/Oncology:  Hematology Normal   Oncology Normal     EENT/Dental:EENT/Dental Normal   Cardiovascular:   Exercise tolerance: poor Hypertension    Pulmonary:  Pulmonary Normal    Renal/:  Renal/ Normal     Hepatic/GI:  Hepatic/GI Normal    Neurological:  Neurology Normal    Endocrine:   Hypothyroidism    Dermatological:  Skin Normal    Psych:   Psychiatric History          Physical Exam  General:  Well nourished    Airway/Jaw/Neck:  Airway Findings: Mouth Opening: Normal Tongue: Normal  General Airway Assessment: Adult  Mallampati: II  TM Distance: Normal, at least 6 cm  Jaw/Neck Findings:  Neck ROM: Normal ROM      Dental:  Dental Findings: In tact        Mental Status:  Mental Status Findings:  Cooperative, Alert and Oriented         Anesthesia Plan  Type of Anesthesia, risks & benefits discussed:  Anesthesia Type:  general  Patient's Preference: GA and regional  Intra-op Monitoring Plan: standard ASA monitors  Intra-op Monitoring Plan Comments:   Post Op Pain Control Plan: multimodal analgesia and peripheral nerve block  Post Op Pain Control Plan Comments:   Induction:   IV  Beta Blocker:  Patient is not currently on a Beta-Blocker (No further documentation required).       Informed Consent: Patient understands risks and agrees with Anesthesia plan.  Questions answered. Anesthesia consent signed with patient.  ASA Score: 3     Day of Surgery Review of History & Physical:  There are no significant changes.  H&P update referred to  the surgeon.         Ready For Surgery From Anesthesia Perspective.

## 2018-01-11 NOTE — H&P (VIEW-ONLY)
Breast Surgery  [facility]  Department of Surgery      REFERRING PROVIDER: No referring provider defined for this encounter.    Chief Complaint: Consult (New DCIS)      Subjective:      Patient ID: Leigh Townsend is a 72 y.o. female who returns after biopsies with findings of DCIS with microinvasive disease in 2 separate quadrants of the breast connected by suspicious calcifications.    She initially presented for clinical breast exam prior to scheduled biopsy.  She had an outside mammogram with new calcifications in a segmental distribution in the right breast upper outer quadrantthat are suspicious, BIRADS IV.  Her films have been reviewed and is scheduled for additional imaging today.    Patient does not routinely do self breast exams.  Patient has not noted a change on breast exam.  Patient denies nipple discharge. Patient admits to to previous breast biopsy, excisional biopsy x4, all benign. Patient denies a personal history of breast cancer.    GYN History:  Age of menarche was 13. Age of menopause was early 50s.  Patient denies hormonal therapy. Patient is . Age of first live birth was 20s.     Past Medical History:   Diagnosis Date    Depression     Hypertension     Hypothyroid      Past Surgical History:   Procedure Laterality Date    BREAST BIOPSY      4 excisional biopsies     SECTION       Current Outpatient Prescriptions on File Prior to Visit   Medication Sig Dispense Refill    clonazePAM (KLONOPIN) 2 MG Tab Take 1 tablet by mouth every evening.      levothyroxine (SYNTHROID) 112 MCG tablet Take 1 tablet by mouth once daily.      losartan (COZAAR) 100 MG tablet Take 1 tablet by mouth once daily.      paroxetine (PAXIL) 40 MG tablet Take 1 tablet by mouth once daily.      traZODone (DESYREL) 100 MG tablet Take 1 tablet by mouth every evening.       No current facility-administered medications on file prior to visit.      Social History     Social History    Marital status:  "     Spouse name: N/A    Number of children: N/A    Years of education: N/A     Occupational History    Not on file.     Social History Main Topics    Smoking status: Current Every Day Smoker     Packs/day: 0.50    Smokeless tobacco: Never Used    Alcohol use No    Drug use: Unknown    Sexual activity: Not on file     Other Topics Concern    Not on file     Social History Narrative    No narrative on file     Family History   Problem Relation Age of Onset    Cancer Paternal Grandmother      throat cancer    Cancer Paternal Grandfather      prostate cancer        Review of Systems   Constitutional: Negative for appetite change, chills, fever and unexpected weight change.   HENT: Negative for facial swelling, postnasal drip and sore throat.    Eyes: Negative for redness and itching.   Respiratory: Negative for chest tightness and shortness of breath.    Cardiovascular: Negative for chest pain and palpitations.   Gastrointestinal: Negative for blood in stool, diarrhea, nausea and vomiting.   Genitourinary: Negative for difficulty urinating and dysuria.   Musculoskeletal: Negative for arthralgias and joint swelling.   Skin: Negative for rash and wound.   Neurological: Negative for dizziness and syncope.   Hematological: Negative for adenopathy.   Psychiatric/Behavioral: Negative for agitation. The patient is not nervous/anxious.      Objective:   BP (!) 159/71 (BP Location: Left arm, Patient Position: Sitting, BP Method: Medium (Automatic))   Pulse 92   Temp 97.8 °F (36.6 °C) (Oral)   Ht 4' 11" (1.499 m)   Wt 61 kg (134 lb 8 oz)   LMP  (LMP Unknown)   BMI 27.17 kg/m²     Physical Exam   Constitutional: She appears well-developed and well-nourished.   HENT:   Head: Normocephalic.   Eyes: No scleral icterus.   Neck: Neck supple. No tracheal deviation present.   Cardiovascular: Normal rate and regular rhythm.    Pulmonary/Chest: Breath sounds normal. No respiratory distress. Right breast exhibits " no inverted nipple, no mass, no nipple discharge and no skin change. Left breast exhibits no inverted nipple, no mass, no nipple discharge and no skin change.       Abdominal: Soft. She exhibits no mass. There is no tenderness.   Musculoskeletal: She exhibits no edema.   Lymphadenopathy:     She has no cervical adenopathy.   Neurological: She is alert.   Skin: No rash noted. No erythema.     Psychiatric: She has a normal mood and affect.       Radiology review: Images personally reviewed by me in the clinic.   Mammogram:  Findings:     Bilateral screening mammogram 10/25/17: The breasts are heterogeneously dense, which may obscure small masses. There are indeterminate calcifications in the right breast. There are stable presumed post-surgical changes in both breasts with radiopaque linear scar markers on the overlying skin. No suspicious finding in the left breast.      Right breast mammogram 11/9/17: Additional evaluation with spot magnification mammography was performed for the right breast calcifications. In the right breast upper outer quadrant, fine linear and fine linear branching calcifications are present, spanning the anterior to middle depths and extending to within 1 cm of the nipple. The calcifications are segmentally distributed, measuring 6.5 x 4.7 x 4.4 cm ([id number]).      Impression:  1) Right breast upper outer quadrant segmental fine linear branching calcifications spanning the anterior to middle depths and extending to within 1 cm of the nipple. Greatest linear extent of involvement is 6.5 cm. Assessment: 4C - Suspicious finding. Biopsy is recommended. Prior to biopsy, limited right breast ultrasound (including assessment of the right axilla) is recommended.     2) No suspicious finding in the left breast.     BI-RADS Category:   Overall: 4C - High Suspicion for Malignancy     Recommendation:  Right breast limited ultrasound, including assessment of the right axilla.     Single site right breast  biopsy of the most suspicious area of calcifications.      PAFINAL PATHOLOGIC DIAGNOSIS  1. CORE BIOPSIES OF RIGHT BREAST:  DUCTAL CARCINOMA IN SITU WITH MICROINVASION SEE DESCRIPTION  HORMONE RECEPTOR STUDIES:  No nuclear estrogen receptor, nuclear progesterone receptor, or HER-2 positivity is identified in the small focus  interpreted as invasive carcinoma. The positive and negative controls stained appropriately. Because this area is  so small, it is my recommendation that these studies be repeated if additional infiltrating carcinoma would be found  in a subsequent resection specimen.  2. CORE BIOPSIES OF RIGHT BREAST:  DUCTAL CARCINOMA IN SITU  3. CORE BIOPSIES OF AXILLARY LYMPH NODE:  NO METASTATIC CARCINOMA IDENTIFIED  [id number],ER,PGR  Diagnosed by: Jeffery Noriega M.D.  (Electronically Signed: 2017-12-11 09:22:32)  Microscopic Examination  1. There are multiple areas of high-grade ductal carcinoma in situ. Microcalcifications are present. In addition there is  a 1 mm cluster with irregular outlines. It is my interpretation that this focus reflects an infiltrating carcinoma. This area  does not have a basal layer with the p63 stain. In addition there are some very small adjacent clusters in a pattern of  infiltrating carcinoma.  3. No metastatic carcinoma is identified in the multiple levels of these core biopsies of lymphoid tissue area.TH:      Assessment:       DCIS with microinvasion     Options for management were discussed with the patient and her family. We reviewed the existing data noting the equivalency of breast conserving surgery with radiation therapy and mastectomy. We also reviewed the guidelines of the National Comprehensive Cancer Network for Stage IA breast carcinoma. We discussed the need for lumpectomy margins to be negative for carcinoma, the necessity for postoperative radiation therapy after breast conservation in most cases, the possibility of a failed or false negative sentinel lymph  node biopsy and the potential need for complete lymphadenectomy for a failed or positive sentinel lymph node biopsy were fully discussed. In the setting of mastectomy, delayed or immediate reconstruction options are available and were discussed.     In the setting of lumpectomy, radiation therapy would be recommended majority of the time.  The duration and treatment side effects were discussed with the patient.  This will coordinated with the radiation oncologist pending final pathology.    We also discussed the role of systemic therapy in the treatment of early stage breast cancer.  We discussed that this is based on tumor biology and bandar status and will be determined based on final pathology.  We discussed that if the cancer is hormone positive, endocrine therapy would be recommended in most cases and its use can reduce the risk of recurrence as well as improve survival. Side effects of treatment were briefly discussed. We also discussed the potential role for chemotherapy based on a number of factors such as tumor phenotype (ER+ vs. triple negative vs. [id number]+) and this would be determined in coordination with the medical oncologist.    The patient, in consultation with her family, has elected to proceed with left total mastectomy and sentinel lymph node biopsy. The operative risks of bleeding, infection, recurrence, scarring, and anesthetic complications and the possibility of requiring further surgery were all noted and informed consent obtained.    Surgery scheduled. Follow-up in clinic roughly 14 days after surgery.     Patient was educated on breast cancer, receptors, wire localization lumpectomy, mastectomy, sentinel lymph node mapping and biopsy, axillary lymph node dissection, reconstruction, breast prosthesis with post-mastectomy bra and radiation therapy. Patient was given patient information binder [facility].  All her questions were answered.    Total time  spent with the patient: 45 minutes.  30 minutes of face to face consultation and 15 minutes of chart review and coordination of care.     All her questions were answered.    Total time spent with the patient: 30 minutes.  20 minutes of face to face consultation and 10 minutes of chart review and coordination of care.

## 2018-01-11 NOTE — ANESTHESIA RELEASE NOTE
Anesthesia Release from PACU Note    Patient: Leigh Townsend    Procedure(s) Performed: Procedure(s) (LRB):  MASTECTOMY 23 hour stay (Right)  INJECTION-NODE-SENTINEL (Right)  BIOPSY-LYMPH NODE-SENTINEL (Right)    Anesthesia type: general and regional    Post pain: Adequate analgesia    Post assessment: no apparent anesthetic complications    Last Vitals:   Visit Vitals  BP (!) 164/77 (BP Location: Left arm, Patient Position: Lying)   Pulse 82   Temp 36.5 °C (97.7 °F) (Temporal)   Resp 19   LMP  (LMP Unknown)   SpO2 95%       Post vital signs: stable    Level of consciousness: awake, alert  and oriented    Nausea/Vomiting: no nausea/no vomiting    Complications: none    Airway Patency: patent    Respiratory: unassisted, room air    Cardiovascular: stable and blood pressure at baseline    Hydration: euvolemic

## 2018-01-11 NOTE — NURSING TRANSFER
Nursing Transfer Note      1/11/2018     Transfer to: 529B    Transfer via: Stretcher    Transfer with: IV Pump; Belongings bags x2    Transported by: PCT    Medicines sent: N/A    Chart send with patient: Yes    Notified: family; Report called to DUTCH Valenzuela    Patient reassessed at: 0068

## 2018-01-12 VITALS
SYSTOLIC BLOOD PRESSURE: 134 MMHG | HEART RATE: 87 BPM | TEMPERATURE: 98 F | OXYGEN SATURATION: 92 % | DIASTOLIC BLOOD PRESSURE: 63 MMHG | RESPIRATION RATE: 14 BRPM

## 2018-01-12 PROCEDURE — 25000003 PHARM REV CODE 250: Performed by: STUDENT IN AN ORGANIZED HEALTH CARE EDUCATION/TRAINING PROGRAM

## 2018-01-12 RX ORDER — OXYCODONE AND ACETAMINOPHEN 5; 325 MG/1; MG/1
1 TABLET ORAL EVERY 4 HOURS PRN
Qty: 41 TABLET | Refills: 0 | Status: SHIPPED | OUTPATIENT
Start: 2018-01-12 | End: 2018-02-07

## 2018-01-12 RX ADMIN — SODIUM CHLORIDE: 0.9 INJECTION, SOLUTION INTRAVENOUS at 06:01

## 2018-01-12 NOTE — PROGRESS NOTES
[facility]  General Surgery  Progress Note    Subjective:     History of Present Illness:  No notes on file    Post-Op Info:  Procedure(s) (LRB):  MASTECTOMY 23 hour stay (Right)  INJECTION-NODE-SENTINEL (Right)  BIOPSY-LYMPH NODE-SENTINEL (Right)   1 Day Post-Op     Interval History: No acute events overnight, afebrile, vital signs stable. Patient tolerated diet without issue. Reports pain is well controlled. Has not yet ambulated.     Medications:  Continuous Infusions:   sodium chloride 0.9% 125 mL/hr at 01/12/18 0628     Scheduled Meds:   clonazePAM  2 mg Oral QHS    levothyroxine  112 mcg Oral Daily    paroxetine  40 mg Oral QHS    traZODone  100 mg Oral QHS     PRN Meds:diphenhydrAMINE, ondansetron, oxyCODONE-acetaminophen, oxyCODONE-acetaminophen, promethazine (PHENERGAN) IVPB, sodium chloride 0.9%     Review of patient's allergies indicates:  No Known Allergies  Objective:     Vital Signs (Most Recent):  Temp: 98.3 °F (36.8 °C) (01/12/18 0436)  Pulse: 80 (01/12/18 0436)  Resp: 16 (01/12/18 0436)  BP: 139/65 (01/12/18 0436)  SpO2: (!) 94 % (01/12/18 0436) Vital Signs (24h Range):  Temp:  [96.7 °F (35.9 °C)-99.2 °F (37.3 °C)] 98.3 °F (36.8 °C)  Pulse:  [70-92] 80  Resp:  [11-20] 16  SpO2:  [92 %-100 %] 94 %  BP: (123-164)/(59-77) 139/65        There is no height or weight on file to calculate BMI.    Intake/Output - Last 3 Shifts       01/10 0700 - 01/11 0659 01/11 0700 - 01/12 0659 01/12 0700 - 01/13 0659    I.V.  1297.9     Total Intake   1297.9      Urine  0     Drains  320     Total Output   320      Net   +977.9             Urine Occurrence  1 x           Physical Exam   Constitutional: She is oriented to person, place, and time. She appears well-developed and well-nourished.   HENT:   Head: Normocephalic and atraumatic.   Nose: Nose normal.   Eyes: Conjunctivae and EOM are normal. Pupils are equal, round, and reactive to light. No scleral icterus.   Neck: Normal range of motion.  No thyromegaly present.   Cardiovascular: Normal rate and regular rhythm.  Exam reveals no gallop and no friction rub.    No murmur heard.  Pulmonary/Chest: Effort normal and breath sounds normal.   Right mastectomy incision clean, dry, intact; minimal lateral bruising noted  Right sided drain with 180cc ss output overnight   Abdominal: Soft. Bowel sounds are normal. She exhibits no distension. There is no tenderness.   Musculoskeletal: Normal range of motion.   Lymphadenopathy:     She has no cervical adenopathy.   Neurological: She is alert and oriented to person, place, and time.   Skin: Skin is warm and dry. No rash noted.       Assessment/Plan:     * Breast cancer, right    1 Day Post-Op s/p right mastectomy with SLNB    - Regular diet  - PO pain meds prn  - Routine drain care daily  - Home meds     - Dispo: home today once ambulates            Rosa Maria Zaman MD  General Surgery  [facility]

## 2018-01-12 NOTE — SUBJECTIVE & OBJECTIVE
Interval History: No acute events overnight, afebrile, vital signs stable. Patient tolerated diet without issue. Reports pain is well controlled. Has not yet ambulated.     Medications:  Continuous Infusions:   sodium chloride 0.9% 125 mL/hr at 01/12/18 0628     Scheduled Meds:   clonazePAM  2 mg Oral QHS    levothyroxine  112 mcg Oral Daily    paroxetine  40 mg Oral QHS    traZODone  100 mg Oral QHS     PRN Meds:diphenhydrAMINE, ondansetron, oxyCODONE-acetaminophen, oxyCODONE-acetaminophen, promethazine (PHENERGAN) IVPB, sodium chloride 0.9%     Review of patient's allergies indicates:  No Known Allergies  Objective:     Vital Signs (Most Recent):  Temp: 98.3 °F (36.8 °C) (01/12/18 0436)  Pulse: 80 (01/12/18 0436)  Resp: 16 (01/12/18 0436)  BP: 139/65 (01/12/18 0436)  SpO2: (!) 94 % (01/12/18 0436) Vital Signs (24h Range):  Temp:  [96.7 °F (35.9 °C)-99.2 °F (37.3 °C)] 98.3 °F (36.8 °C)  Pulse:  [70-92] 80  Resp:  [11-20] 16  SpO2:  [92 %-100 %] 94 %  BP: (123-164)/(59-77) 139/65        There is no height or weight on file to calculate BMI.    Intake/Output - Last 3 Shifts       01/10 0700 - 01/11 0659 01/11 0700 - 01/12 0659 01/12 0700 - 01/13 0659    I.V.  1297.9     Total Intake   1297.9      Urine  0     Drains  320     Total Output   320      Net   +977.9             Urine Occurrence  1 x           Physical Exam   Constitutional: She is oriented to person, place, and time. She appears well-developed and well-nourished.   HENT:   Head: Normocephalic and atraumatic.   Nose: Nose normal.   Eyes: Conjunctivae and EOM are normal. Pupils are equal, round, and reactive to light. No scleral icterus.   Neck: Normal range of motion. No thyromegaly present.   Cardiovascular: Normal rate and regular rhythm.  Exam reveals no gallop and no friction rub.    No murmur heard.  Pulmonary/Chest: Effort normal and breath sounds normal.   Right mastectomy incision clean, dry, intact; minimal lateral bruising noted  Right sided  drain with 180cc ss output overnight   Abdominal: Soft. Bowel sounds are normal. She exhibits no distension. There is no tenderness.   Musculoskeletal: Normal range of motion.   Lymphadenopathy:     She has no cervical adenopathy.   Neurological: She is alert and oriented to person, place, and time.   Skin: Skin is warm and dry. No rash noted.

## 2018-01-12 NOTE — ASSESSMENT & PLAN NOTE
1 Day Post-Op s/p right mastectomy with SLNB    - Regular diet  - PO pain meds prn  - Routine drain care daily  - Home meds     - Dispo: home today once ambulates

## 2018-01-12 NOTE — PROGRESS NOTES
Pt states that she takes Clonazepam 2 mg QHS. No current order in place. Paged on-call to notify; awaiting return response. Will continue to monitor.

## 2018-01-12 NOTE — PROGRESS NOTES
Pt discharged to home via wheelchair. Pt denies denies pain at the present time. Condition stable. Follows commands. Pt given prescriptions and copy of discharge home care instructions. Pt verbalized understanding of activity limitations and s/s of when to call the dr. Pt also given information on followup appointment. All questions answered. All belongings with the patient. Pt verbalized understanding of all discharge instructions.

## 2018-01-12 NOTE — DISCHARGE SUMMARY
[facility]  DISCHARGE SUMMARY  General Surgery      Admit Date:  1/11/2018    Discharge Date and Time:  1/12/2018  8:00 AM    Attending Physician:  Pema Mesa MD     Discharge Provider:  Rosa Maria Zaman MD     Reason for Admission:  Breast cancer, right     Procedures Performed:  Procedure(s) (LRB):  MASTECTOMY 23 hour stay (Right)  INJECTION-NODE-SENTINEL (Right)  BIOPSY-LYMPH NODE-SENTINEL (Right)    Hospital Course:  Please see the preoperative H&P and other available documentation for full details related to history prior to this admission.  Briefly, Leigh Townsend is a 72 y.o. 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    Following a complete preoperative discussion of the risks and benefits of surgery with signed informed consent, the patient was taken to the operating room on 1/11/2018 and underwent the above stated procedures.  The patient tolerated surgery well and there were no complications.  Please see the operative report for full intraoperative findings and details.  Postoperatively, the patient did well and was transferred from the PACU to the floor in stable condition where they had a stable and uncomplicated hospital course.  Vital signs remained stable and appropriate throughout course. Drain output was appropriate.  Diet was well tolerated and the patient's pain was controlled on oral pain medications without problem.  Currently, the patient is doing well at 1 Day Post-Op and is stable and appropriate for discharge home at this time.    Consults:  None.    Significant Diagnostic Studies:   No results for input(s): WBC, HGB, HCT, PLT, BAND, METAMYELOCYT, MYELOPCT, HGBA1C in the last 72 hours.No results for input(s): NA, K, CL, CO2, BUN, CREATININE, GLU, CALCIUM, CAION, MG, PHOS, AST, ALT, ALKPHOS, BILITOT, BILIDIR, PROT, ALBUMIN,  PREALBUMIN, AMYLASE, LIPASE, CRP, HSCRP, SEDRATE, PROCAL in the last 72 hours.No results for input(s): INR, PTT, LABHEPA, LACTATE, TROPONINI, CPK, CPKMB, MB, BNP in the last 72 hours.No results for input(s): PH, PCO2, PO2, HCO3 in the last 72 hours.      Final Diagnoses:   Principal Problem:  Breast cancer, right   Secondary Diagnoses:    Active Hospital Problems    Diagnosis  POA    *Breast cancer, right [C50.911]  Yes      Resolved Hospital Problems    Diagnosis Date Resolved POA   No resolved problems to display.       Discharged Condition:  Good    Disposition:  Home or Self Care    Follow Up/Patient Instructions: 2wks with Dr. Mesa    Medications:  Reconciled Home Medications:  Current Discharge Medication List      START taking these medications    Details   oxyCODONE-acetaminophen (PERCOCET) 5-325 mg per tablet Take 1 tablet by mouth every 4 (four) hours as needed.  Qty: 41 tablet, Refills: 0         CONTINUE these medications which have NOT CHANGED    Details   clonazePAM (KLONOPIN) 2 MG Tab Take 1 tablet by mouth every evening.      levothyroxine (SYNTHROID) 112 MCG tablet Take 1 tablet by mouth once daily.      losartan (COZAAR) 100 MG tablet Take 1 tablet by mouth every evening.       paroxetine (PAXIL) 40 MG tablet Take 1 tablet by mouth every evening.       traZODone (DESYREL) 100 MG tablet Take 1 tablet by mouth every evening.             Discharge Procedure Orders  Other restrictions (specify):   Order Comments: POSTOPERATIVE INSTRUCTIONS FOLLOWING   MASTECTOMY AND/OR AXILLARY LYMPH NODE DISSECTION    The following are post-operative instructions that will help you to recover from your surgery.  Please read over these instructions carefully and contact us if we can answer any of your questions or concerns.    Post-op care/Dressing/breast binder (surgi-bra)  A surgical bra may be placed around your chest after your surgery.  If you are given the bra, please wear it for the first 48 hours after  surgery. After 48 hours you can remove your surgical bra and dressing to shower/cleanse the chest wall with antibacterial soap and warm water. Do not take a tub bath and do not soak the surgical site for at least 2 weeks.     The final pathology report will be available approximately 7-10 days after your surgery.  Our office will call you with your pathology report when it becomes available.    If blue dye was used to locate your sentinel lymph nodes, your urine and stool may be blue-green in color for 1 or 2 days.    Dr. Castro patients: please wear the surgical bra as close to 24 hours a day as possible until your post-operative clinic appointment.  If the elastic around the bra irritates your skin, you may wear a soft t-shirt underneath the bra. You may shower AFTER the drains are removed.  Please sponge bathe until then. Please do not remove the white strips of tape (steri-strips) that cover your incision.  They will be removed at your clinic visit. You may go without wearing the bra long enough to bath, to launder and dry the bra. If you have fluffy filler placed inside the bra, the filler should be removed whenever the bra is taken off. Please reinsert the fluffy filler, or insert the new soft filler, under the bra when you put the bra back on.  If the bra is extremely uncomfortable, you may wear a supportive sports bra instead after 2 days.    Activity   You will be able to do much of your own personal care, such as bathing, dressing, preparing simple meals, etc.  A short walk each day will help with your recovery  You may find that you need to take rest breaks between activities, but you should not need to stay in bed for prolonged periods of time during the day. A good rule during this time is to listen to your body, do what is comfortable, and stop and rest when your feel tired.  If it hurts, don't do it.  Return to taking your daily medications as prescribed  Please avoid activities that require moderate  "to heavy lifting (grocery shopping) or pushing/pulling (vacuuming) and repetitive motions (such as washing windows). Do not lift anything heavier than a gallon of milk.  Following a lymph node dissection, don't avoid using your arm, but don't exercise your arm until after your first post-operative visit.  At your first post-op visit, you will be given arm exercises to regain movement and flexibility.  You may be referred to physical therapy if needed.  You may restart driving when you are no longer on narcotics and you feel safe turning the wheel and stopping quickly.  You will need to be out of work approximately 2-6 weeks depending on your particular surgery and how well you are recovering.  We will evaluate how you are doing at the first post-op appointment.  This is a good time to ask when you may return to work and what activities you may do.    Medication for pain  You will be given a prescription for pain medication. You should not drive or operate machinery while taking these.  Please take prescription pain medication (narcotics) with food.  Narcotics can cause, or worsen, constipation.  You will need to increase your fluid intake, eat high fiber foods (such as fruits and bran) and make sure that you are up and walking. You may need to take an over the counter stool softener for constipation.  Short term use of an icepack may be helpful to decrease discomfort and swelling, particularly to the armpit after lymph node surgery.  A small pillow positioned in the armpit may also decrease discomfort after lymph node surgery.  If you are given a prescription for antibiotics, take them as prescribed.    How to care for your Drain(s)  Wash hands-STRIP or "milk" the drainage tube as it comes out of your body toward the bulb.   Beginning where the drain comes out of your body, hold drainage tubing with one hand and with the other, stretch and release tubing an inch at time while moving downward with both hands toward " the bulb.  Do this 2-3 times before emptying the bulb.  Remove the stopper from the bulb's port  (drainage port)  Pour the drainage in the measuring cup provided by the nurse  Flatten/squeeze the bulb to create a vacuum and replace the stopper before letting go of the bulb.  Record the date, time and amount of drainage in cc's (not ounces) each time bulb is emptied. If you have more than one drain, record each separately.  Discard the drainage into the toilet after measuring and then wash hands.  Empty bulbs 2-3 times/day or as needed if it fills up before 8 hours.  Remember to bring the output record with you to your doctor's appointment.    Please report the following:  Temperature greater than 101 degrees  Discharge or bad odor from the wound  Excessive bleeding, such as saturated bloody dressing or extreme bruising  Redness at incision and/or drain sites  Swelling or buildup of fluid around incision  Persistent fevers, chills, nausea, vomiting, or diarrhea    Additional information  Your surgeon will see you approximately 2 weeks following your surgery.  If this follow-up appointment has not been made, please call the office.    If you have any questions or problems, please call my office or my nurse.    DUTCH [address] Dr., Dr., RN Jamie Gambino, DUTCH Turpin RN  [phone]    Tori Lancaster PA-C [phone]  Areli Calderon [phone]    After hours and on weekends, you may call the main [facility] line at [phone] and ask to have the general surgery resident paged or have me paged      Lymphedema Risk Reduction    Lymphedema is a swelling of a part of the body, caused by an insufficient lymphatic system and an accumulation of fluid in the body's tissues.  Lymphedema may occur when normal drainage of fluid is disrupted, such as an infection, injury, cancer, scar  tissue, or removal of lymph nodes.    If you had a full axillary lymph node dissection procedure, you may be at greater risk for lymphedema.     For those patients having a sentinel lymph node biopsy, these risks may be smaller and the recommendations are provided for your review and consideration.    The following list contains recommendations for reducing your risk of developing lymphedema.    Skin Care-avoid trauma/injury to reduce infection risk  Keep the hand and arm on the side of surgery clean and dry  Pay attention to nail care and do not cut cuticles  Avoid punctures, such as injections and blood draws from you on the side of your surgery  Wear gloves while doing activities that may cause skin injury (washing dishes, gardening, etc.)  If scratches or punctures occur, wash area with soap and water, and observe for signs of infections (redness, drainage, swelling)  If a rash, itching, redness, pain, increased skin temperatures, fever, or flu-like symptoms occur, contact your physician immediately for early treatment of a possible infection  Activity/Lifestyle  Gradually build up the duration and intensity of any activity or exercise  Take frequent rest periods during activity to allow for arm recovery  Monitor your arm and upper body during and after activity for any change in size, shape, tissue, texture, soreness, heaviness, or firmness  Avoid constriction of your arm on the side of your surgery  Avoid having blood pressure taken on the arm on the side of your surgery  Wear loose fitting jewelry and clothing  Be careful not to rest a heavy purse, luggage, or grocery bags on that arm  When you return to wearing a bra, make sure that it is well fitted and not too tight     Notify your health care provider if you experience any of the following:  increased confusion or weakness     Notify your health care provider if you experience any of the following:  persistent dizziness, light-headedness, or visual  disturbances     Notify your health care provider if you experience any of the following:  worsening rash     Notify your health care provider if you experience any of the following:  severe persistent headache     Notify your health care provider if you experience any of the following:  difficulty breathing or increased cough     Notify your health care provider if you experience any of the following:  redness, tenderness, or signs of infection (pain, swelling, redness, odor or green/yellow discharge around incision site)     Notify your health care provider if you experience any of the following:  severe uncontrolled pain     Notify your health care provider if you experience any of the following:  persistent nausea and vomiting or diarrhea     Notify your health care provider if you experience any of the following:  temperature >100.4     No dressing needed       Follow-up Information     Pema Mesa MD In 2 weeks.    Specialties:  General Surgery, Breast Surgery  Contact information:  [facility]  [facility] [id number] [phone]                   Eileen Zaman MD  PGY-1 General Surgery   [phone]

## 2018-01-15 ENCOUNTER — TELEPHONE (OUTPATIENT)
Dept: SURGERY | Facility: CLINIC | Age: 73
End: 2018-01-15

## 2018-01-15 NOTE — TELEPHONE ENCOUNTER
----- Message from Ace Villatoro sent at 1/15/2018 11:51 AM CST -----  Patient states that (s)he needs to speak with nurse in ref to where she can get a mastectomy bra//please call back at [phone]//thank you

## 2018-01-15 NOTE — OP NOTE
Operative Note     1/12/2018    PRE-OP DIAGNOSIS: Malignant neoplasm of overlapping sites of right breast in female, estrogen receptor negative [C50.811, Z17.1]      POST-OP DIAGNOSIS: Post-Op Diagnosis Codes:     * Malignant neoplasm of overlapping sites of right breast in female, estrogen receptor negative [C50.811, Z17.1]    Procedure(s):  MASTECTOMY TOTAL -23 hour stay  INJECTION-NODE-SENTINEL  BIOPSY-LYMPH NODE-SENTINEL     SURGEON: Surgeon(s) and Role:     * Pema Mesa MD - Primary     * Nori Crawley MD - Resident - Assisting    ANESTHESIA: General and regional    OPERATIVE FINDINGS: Flaps were healthy appearing at completion of mastectomy.  Frozen section of nodes, all negative.    INDICATION FOR PROCEDURE: This patient presents with a history of microinvasive carcinoma in DCIS of the right breast    PROCEDURE IN DETAIL:  Leigh Townsend is a 72 y.o. female brought to the operating room for definitive surgery of ductal carcinoma of the right breast.  The patient has elected to undergo right simple mastectomy with sentinel lymph node biopsy for bandar assessment. The patient was informed of the possible risks and complications of the procedure, including but not limited to anesthetic risks, bleeding, infection, and need for additional surgery.  The patient concurred with the proposed plan, and has given informed consent.  The site of surgery was properly noted/marked in the preoperative holding area.    The patient's right breast was injected with technetium to facilitate sentinel lymph node identification. The patient was brought to the operating room and placed in the supine position with both upper extremities extended.  regional, general and anesthesia were administered Perioperative antibiotics were administered consisting of Ancef and a time out was performed confirming the patient, site, and procedure.  The right chest and axilla was then prepped and draped in the usual sterile fashion.    We  then turned our attention to the right breast where an elliptical incision was fashioned to incorporate the nipple areolar complex.  The incision was made with a 10-blade and extended through the subcutaneous tissues with Bovie electrocautery.  Skin flaps were raised to the clavicle superiorly.  We then  turned our attention to the right axilla.  The gamma probe was used to identify an area of increased radioactivity within the lower axilla. The clavipectoral sheath was sharply incised to reveal the level I axillary lymph nodes. The probe was used to identify a single node with increased radioactivity.  This node was brought into the operative field and carefully dissected free of the surrounding lymphovascular structures.  The highest ex vivo count of the node was 4800.  The node was then sent to pathology for frozen section evaluation, labeled as sentinel node #1.  A total of 3 axillary sentinel nodes and 0 axillary non-sentinel nodes were identified, excised and submitted to pathology.  Bed counts were obtained to confirm that the 10% rule had not been violated.   The wound was irrigated with normal saline, and all bleeding points were secured with Bovie electrocautery.     We then proceeded to raise the remainder of the flaps to the lateral border of the sternum medially, to the inframammary fold inferiorly, and to the anterior border of the latissimus dorsi muscle laterally. The breast tissue was sharply excised off the chest wall taking care to incorporate the pectoralis fascia while leaving the serratus fascia behind.  The resulting mastectomy specimen was marked using a short stitch superiorly and long stitch laterally.  The breast was sent to pathology for permanent evaluation.      Frozen section bandar evaluation revealed no evidence of metastatic disease.  Therefore, the operative field was irrigated with normal saline and all bleeding points were secured with Bovie electrocautery.  A 15 Fr jocelyne drain  was placed under the mastectomy flap. The incision was closed using an interrupted 3-0 vicryl deep dermal stitch followed by a running 4-0 monocryl subcuticular.      Steristrips, gauze, and tegaderm were applied. A post surgical bra was placed on the patient. At the end of the operation, all sponge, instrument, and needle counts x 2 were correct.    ESTIMATED BLOOD LOSS: less than 50 mL    SPECIMENS:   Specimens     Start     Ordered    01/11/18 1242  Specimen to Pathology - Surgery  Once      01/11/18 1241       1.) Right Axillary [place] Lymph Node # 1, Hot, 4800-Frozen.  2.) Right Axillary [place] Lymph Node # 2, Hot, 1200-Frozen.  3.) Right Axillary [place] Lymph Node # 3, Hot, 480-Frozen.  4.) Right breast, sort stitch superior, long stitch lateral - Permanent.    COMPLICATIONS: none    DISPOSITION: PACU - hemodynamically stable.    ATTESTATION:   I was present and scrubbed for the entire procedure.

## 2018-01-15 NOTE — TELEPHONE ENCOUNTER
Return call to pt. Asks about mastectomy bra. Informed pt that she does not need that type of bra just yet. Pt s/p mastectomy w/Dr Mesa 1/11/18, and recovering at her daughter's house in [place]. Pt had questions about the drain and bra, all of which were answered to the pt's understanding.Pt scheduled to see Dr Mesa 1/24/18 1:45 pm.

## 2018-01-24 ENCOUNTER — OFFICE VISIT (OUTPATIENT)
Dept: SURGERY | Facility: CLINIC | Age: 73
End: 2018-01-24
Payer: MEDICARE

## 2018-01-24 VITALS
TEMPERATURE: 99 F | WEIGHT: 127 LBS | HEART RATE: 75 BPM | BODY MASS INDEX: 25.6 KG/M2 | HEIGHT: 59 IN | DIASTOLIC BLOOD PRESSURE: 82 MMHG | SYSTOLIC BLOOD PRESSURE: 190 MMHG

## 2018-01-24 DIAGNOSIS — Z17.0 MALIGNANT NEOPLASM OF OVERLAPPING SITES OF RIGHT BREAST IN FEMALE, ESTROGEN RECEPTOR POSITIVE: Primary | ICD-10-CM

## 2018-01-24 DIAGNOSIS — C50.811 MALIGNANT NEOPLASM OF OVERLAPPING SITES OF RIGHT BREAST IN FEMALE, ESTROGEN RECEPTOR POSITIVE: Primary | ICD-10-CM

## 2018-01-24 PROCEDURE — 99024 POSTOP FOLLOW-UP VISIT: CPT | Mod: S$GLB,,, | Performed by: SURGERY

## 2018-01-24 PROCEDURE — 99999 PR PBB SHADOW E&M-EST. PATIENT-LVL III: CPT | Mod: PBBFAC,,, | Performed by: SURGERY

## 2018-01-30 ENCOUNTER — TUMOR BOARD CONFERENCE (OUTPATIENT)
Dept: SURGERY | Facility: CLINIC | Age: 73
End: 2018-01-30

## 2018-01-30 NOTE — PROGRESS NOTES
Interdisciplinary Breast Cancer Conference    Leigh Townsend    Female    Date Presented to Tumor Board: 01/30/18    HOSPTIAL/CLINIC PRESENTING: [facility]    TUMOR SITE: RIGHT    TUMOR SITE: UOQ (calcs spanning anterior to middle depth, US did not correlate but did show abnormal lymph node that was biopsied and found to be negative)    Presenter: Nadia Willams, Wilmer Denis (on behalf of Pema Mesa MD)    Reason For Consultation: Initial Presentation    Specialties Present: Medical Oncology;Radiation Oncology;Surgical Oncology;Pathology;Navigation;Research;Radiology;Physical / Occupational Therapy    Patient Status: a current patient    Treatment to Date: Surgical Intervention(s) (mastectomy)    Clinical Trial Eligibility: None available    Estrogen Receptor Status: Negative    Progesterone Status: Negative    Her2/JENSEN Status: Negative    pT1mi(sn)N0, no residual microinvasion on surgical specimen (seen on biopsy, too small to get grade), 3 negative lymph nodes  Stage IA per AJCC 8th ed. pathologic prognostic staging system      RECOMMENDED PLAN: Additional Observation   No further therapy    UNSTAGEABLE: No         PRESENTATION AT CANCER CONFERENCE: Prospective

## 2018-02-07 ENCOUNTER — OFFICE VISIT (OUTPATIENT)
Dept: SURGERY | Facility: CLINIC | Age: 73
End: 2018-02-07
Payer: MEDICARE

## 2018-02-07 VITALS
SYSTOLIC BLOOD PRESSURE: 150 MMHG | HEIGHT: 59 IN | DIASTOLIC BLOOD PRESSURE: 65 MMHG | WEIGHT: 130 LBS | HEART RATE: 74 BPM | TEMPERATURE: 99 F | BODY MASS INDEX: 26.21 KG/M2

## 2018-02-07 DIAGNOSIS — Z17.1 MALIGNANT NEOPLASM OF OVERLAPPING SITES OF RIGHT BREAST IN FEMALE, ESTROGEN RECEPTOR NEGATIVE: Primary | ICD-10-CM

## 2018-02-07 DIAGNOSIS — C50.811 MALIGNANT NEOPLASM OF OVERLAPPING SITES OF RIGHT BREAST IN FEMALE, ESTROGEN RECEPTOR NEGATIVE: Primary | ICD-10-CM

## 2018-02-07 PROCEDURE — 99024 POSTOP FOLLOW-UP VISIT: CPT | Mod: S$GLB,,, | Performed by: SURGERY

## 2018-02-07 PROCEDURE — 99999 PR PBB SHADOW E&M-EST. PATIENT-LVL III: CPT | Mod: PBBFAC,,, | Performed by: SURGERY

## 2018-02-07 RX ORDER — TRAZODONE HYDROCHLORIDE 100 MG/1
100 TABLET ORAL NIGHTLY
COMMUNITY
End: 2020-08-03

## 2018-02-07 RX ORDER — CLONAZEPAM 2 MG/1
2 TABLET ORAL 2 TIMES DAILY
COMMUNITY
End: 2023-02-17

## 2018-02-07 RX ORDER — PAROXETINE HYDROCHLORIDE 40 MG/1
40 TABLET, FILM COATED ORAL EVERY MORNING
COMMUNITY
End: 2020-08-03

## 2018-02-14 PROBLEM — Z17.1 MALIGNANT NEOPLASM OF OVERLAPPING SITES OF RIGHT BREAST IN FEMALE, ESTROGEN RECEPTOR NEGATIVE: Status: ACTIVE | Noted: 2017-12-20

## 2018-02-14 NOTE — PROGRESS NOTES
Breast Surgery  [facility]  Department of Surgery      REFERRING PROVIDER: No referring provider defined for this encounter.    Chief Complaint: Follow-up (Post-op Incision Poss Drain Removed .) and Post-op Evaluation      Subjective:      Patient ID: Leigh Townsend is a 72 y.o. female who returns after R total mastectomy, SLNB.  Doing well with pain controlled but a significant portion of her incision did separate centrally.    This was closed in clinic with interrupted nylon sutures and has now healed appropriately.  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      She initially presented for clinical breast exam prior to scheduled biopsy.  She had an outside mammogram with new calcifications in a segmental distribution in the right breast upper outer quadrantthat are suspicious, BIRADS IV.  Her films have been reviewed and is scheduled for additional imaging today.  biopsies with findings of DCIS with microinvasive disease in 2 separate quadrants of the breast connected by suspicious calcifications.    Patient does not routinely do self breast exams.  Patient has not noted a change on breast exam.  Patient denies nipple discharge. Patient admits to to previous breast biopsy, excisional biopsy x4, all benign. Patient denies a personal history of breast cancer.    GYN History:  Age of menarche was 13. Age of menopause was early 50s.  Patient denies hormonal therapy. Patient is . Age of first live birth was 21s.     Past Medical History:   Diagnosis Date    Depression     Hypertension     Hypothyroid      Past Surgical History:   Procedure Laterality Date    BREAST BIOPSY      4 excisional biopsies     SECTION       Current Outpatient Prescriptions on File Prior to Visit   Medication Sig Dispense Refill    levothyroxine (SYNTHROID) 112 MCG tablet Take 1 tablet by mouth once daily.      losartan (COZAAR) 100 MG tablet Take 1 tablet by mouth  "every evening.        No current facility-administered medications on file prior to visit.      Social History     Social History    Marital status:      Spouse name: N/A    Number of children: N/A    Years of education: N/A     Occupational History    Not on file.     Social History Main Topics    Smoking status: Current Every Day Smoker     Packs/day: 0.50    Smokeless tobacco: Never Used    Alcohol use No    Drug use: Unknown    Sexual activity: Not on file     Other Topics Concern    Not on file     Social History Narrative    No narrative on file     Family History   Problem Relation Age of Onset    Cancer Paternal Grandmother      throat cancer    Cancer Paternal Grandfather      prostate cancer        Review of Systems   Constitutional: Negative for appetite change, chills, fever and unexpected weight change.   HENT: Negative for facial swelling, postnasal drip and sore throat.    Eyes: Negative for redness and itching.   Respiratory: Negative for chest tightness and shortness of breath.    Cardiovascular: Negative for chest pain and palpitations.   Gastrointestinal: Negative for blood in stool, diarrhea, nausea and vomiting.   Genitourinary: Negative for difficulty urinating and dysuria.   Musculoskeletal: Negative for arthralgias and joint swelling.   Skin: Negative for rash and wound.   Neurological: Negative for dizziness and syncope.   Hematological: Negative for adenopathy.   Psychiatric/Behavioral: Negative for agitation. The patient is not nervous/anxious.      Objective:   BP (!) 150/65 (BP Location: Left arm, Patient Position: Sitting, BP Method: Medium (Automatic))   Pulse 74   Temp 99.1 °F (37.3 °C) (Oral)   Ht 4' 11" (1.499 m)   Wt 59 kg (130 lb)   LMP  (LMP Unknown)   BMI 26.26 kg/m²     Physical Exam   Constitutional: She appears well-developed and well-nourished.   HENT:   Head: Normocephalic.   Eyes: No scleral icterus.   Neck: Neck supple. No tracheal deviation " present.   Cardiovascular: Normal rate and regular rhythm.    Pulmonary/Chest: Breath sounds normal. No respiratory distress. Left breast exhibits no inverted nipple, no mass, no nipple discharge and no skin change.       Abdominal: Soft. She exhibits no mass. There is no tenderness.   Musculoskeletal: She exhibits no edema.   Lymphadenopathy:     She has no cervical adenopathy.   Neurological: She is alert.   Skin: No rash noted. No erythema.     Psychiatric: She has a normal mood and affect.       Radiology review: Images personally reviewed by me in the clinic.       PATH:    FINAL PATHOLOGIC DIAGNOSIS  #1 RIGHT AXILLARY SENTINEL LYMPH NODE #1, HOT, 4800:  1 LYMPH NODE WITH NO METASTATIC CARCINOMA IDENTIFIED.  IMMUNOSTAINS (AE1/AE3, CAM 5.2 AND KERATIN-WS) CONFIRMATORY, WITH APPROPRIATE POSITIVE  AND NEGATIVE CONTROLS.  #2 RIGHT AXILLARY SENTINEL LYMPH NODE #2, HOT, 1200:  1 LYMPH NODE WITH NO METASTATIC CARCINOMA IDENTIFIED.  IMMUNOSTAINS (AE1/AE3, CAM 5.2 AND KERATIN-WS) CONFIRMATORY, WITH APPROPRIATE POSITIVE    AND NEGATIVE CONTROLS.  #3 RIGHT AXILLARY SENTINEL LYMPH NODE #3, HOT, 480:  1 LYMPH NODE WITH NO METASTATIC CARCINOMA IDENTIFIED.  IMMUNOSTAINS (AE1/AE3, CAM 5.2 AND KERATIN-WS) CONFIRMATORY, WITH APPROPRIATE POSITIVE  AND NEGATIVE CONTROLS.  #4 RIGHT BREAST, SHORT STITCH SUPERIOR, LONG STITCH LATERAL:  NO RESIDUAL MICRO-INVASIVE CARCINOMA IDENTIFIED.  DUCTAL CARCINOMA IN SITU, ADJACENT TO PREVIOUS BIOPSY SITE.  NIPPLE AND SKIN WITH NO MALIGNANCY IDENTIFIED.  PLEASE SEE SYNOPTIC REPORT BELOW.  Comment: Dr. Schuler has reviewed selective slides of this case and concurs with the diagnosis.  SYNOPTIC REPORT:  Procedure:  -- Right breast mastectomy  Specimen Laterality:  -- Right  Tumor Size:  -- Microinvasion only, please see previous biopsy [id number]  Histologic Type:  -- Micro-invasive carcinoma, please see previous biopsy [id number]  Histologic Grade (Tawny histologic score):  --  Glandular/tubular differentiation: Only microinvasion present (not graded)  -- Nuclear pleomorphism: Only microinvasion present (not graded)  -- Mitotic Count: Only microinvasion present (not graded)  -- Overall Grade: Only microinvasion present (not graded)  Ductal carcinoma in situ:  -- DCIS is present in specimen, positive for EIC, with comedonecrosis  Margins:  -- Uninvolved by invasive carcinoma (no invasive carcinoma identified in current mastectomy specimen).  -- Uninvolved by ductal carcinoma in situ (2 cm from the closest deep margin).  Regional Lymph Nodes:  -- Number of lymph nodes with macrometastasis: 0  -- Number of lymph nodes with micrometastasis: 0  -- Number of lymph nodes with isolated tumor cells: 0  -- Total number of lymph nodes examined: 3  -- Number of sentinel lymph nodes examined: 3  Treatment Effect    -- No known presurgical therapy  Pathologic Staging:  -- Primary tumor: pT1mi (please see previous biopsy [id number])  -- Regional lymph nodes: pN0 (sn): [place] node(s) evaluated.  -- Distant metastasis: Not applicable.  Ancillary Studies (immunohistochemical results on invasive carcinoma performed on previous biopsy [id number]):  -- No nuclear estrogen receptor, nuclear progesterone receptor, or HER-2 positivity is identified in the small focus  interpreted as invasive carcinoma.      Assessment:       DCIS with microinvasion    Hormone negative cancer so will not need hormonal therapy    Return in 4 months for surveillance CBE    Will need survivorship with Irina in 6 months.

## 2018-02-15 ENCOUNTER — OFFICE VISIT (OUTPATIENT)
Dept: SURGERY | Facility: CLINIC | Age: 73
End: 2018-02-15
Payer: MEDICARE

## 2018-02-15 VITALS
TEMPERATURE: 98 F | HEIGHT: 59 IN | HEART RATE: 91 BPM | WEIGHT: 133.38 LBS | SYSTOLIC BLOOD PRESSURE: 147 MMHG | DIASTOLIC BLOOD PRESSURE: 65 MMHG | BODY MASS INDEX: 26.89 KG/M2 | RESPIRATION RATE: 17 BRPM

## 2018-02-15 DIAGNOSIS — Z17.1 MALIGNANT NEOPLASM OF OVERLAPPING SITES OF RIGHT BREAST IN FEMALE, ESTROGEN RECEPTOR NEGATIVE: Primary | ICD-10-CM

## 2018-02-15 DIAGNOSIS — C50.811 MALIGNANT NEOPLASM OF OVERLAPPING SITES OF RIGHT BREAST IN FEMALE, ESTROGEN RECEPTOR NEGATIVE: Primary | ICD-10-CM

## 2018-02-15 PROCEDURE — 99999 PR PBB SHADOW E&M-EST. PATIENT-LVL III: CPT | Mod: PBBFAC,,, | Performed by: INTERNAL MEDICINE

## 2018-02-15 PROCEDURE — 3008F BODY MASS INDEX DOCD: CPT | Mod: S$GLB,,, | Performed by: INTERNAL MEDICINE

## 2018-02-15 PROCEDURE — 1159F MED LIST DOCD IN RCRD: CPT | Mod: S$GLB,,, | Performed by: INTERNAL MEDICINE

## 2018-02-15 PROCEDURE — 99203 OFFICE O/P NEW LOW 30 MIN: CPT | Mod: S$GLB,,, | Performed by: INTERNAL MEDICINE

## 2018-02-15 PROCEDURE — 1126F AMNT PAIN NOTED NONE PRSNT: CPT | Mod: S$GLB,,, | Performed by: INTERNAL MEDICINE

## 2018-02-15 NOTE — PROGRESS NOTES
Subjective:      Patient ID: Leigh Townsend is a 72 y.o. female.    Chief Complaint: No chief complaint on file.      HPI: Ms. Townsend is a 72-year-old female referred by Dr. Mesa for recent diagnosis of right breast cancer.  She has a history of benign breast biopsy X 4(both breasts were biopsied).    Screening mammogram on October 25, 2017 showed indeterminate calcifications in the right breast.  Follow-up diagnostic mammogram on November 9, 2017 showed fine linear calcifications in the upper-outer quadrant measuring 6.5 x 4.7 x 4.4 cm.  There was no ultrasonographic correlate.    On December 6, 2017 biopsy was performed which showed DCIS with microinvasion.  The DCIS was high grade.  The invasive Tumor was ER, KS, and HER-2 negative.    On January 11, 2018 mastectomy and sentinel lymph node biopsy was performed.  That showed only residual DCIS.  3 sentinel lymph nodes were negative.  Final pathological stage TI 1 N0 stage IA        Menstrual History:    Menarche - 13   G -1   P - 1  First birth age - 19-20,BCP - no    Menopause -  early 50s     HRT - no    Family History -                                 Breast - no                                Ovarian -  no                                       Other - father - liver, paternal grandmother - throat, paternal grandfather - prostate    Social History :    Smoking -  Active since age 13, still smoking< 1PPD                                            ETOH - no  Retired     PMH: Depression, hypothyroidism, hypertension      Review of Systems   Constitutional: Negative.    HENT: Negative.    Respiratory: Negative.    Cardiovascular: Negative.    Gastrointestinal: Negative.    Genitourinary: Negative.    Musculoskeletal: Negative.    Psychiatric/Behavioral: Positive for dysphoric mood.     Objective:   Physical Exam   Vitals reviewed.  Constitutional: She is oriented to person, place, and time. She appears well-developed and well-nourished. No distress.   HENT:    Mouth/Throat: No oropharyngeal exudate.   Eyes: No scleral icterus.   Cardiovascular: Normal rate and regular rhythm.    Pulmonary/Chest: Effort normal and breath sounds normal. She has no wheezes. She has no rales. Left breast exhibits no mass, no nipple discharge and no skin change.       Abdominal: She exhibits no mass. There is no tenderness.   Musculoskeletal: She exhibits no edema.   Lymphadenopathy:     She has no cervical adenopathy.     She has no axillary adenopathy.        Right: No supraclavicular adenopathy present.        Left: No supraclavicular adenopathy present.   Neurological: She is alert and oriented to person, place, and time.   Skin: No rash noted.     Psychiatric: She has a normal mood and affect. Her behavior is normal. Thought content normal.     Assessment:       1. Malignant neoplasm of overlapping sites of right breast in female, estrogen receptor negative        Plan:       I discussed role of adjuvant systemic therapy.  She had microinvasive disease which was ER negative.  She has undergone mastectomy.  I do not believe she would derive significant benefit from any adjuvant systemic therapy.   RTC PRN.    Distress Screening Results: Psychosocial Distress screening score of Distress Score: 0 noted and reviewed. No intervention indicated.

## 2018-08-08 ENCOUNTER — OFFICE VISIT (OUTPATIENT)
Dept: SURGERY | Facility: CLINIC | Age: 73
End: 2018-08-08
Payer: MEDICARE

## 2018-08-08 VITALS
HEIGHT: 59 IN | HEART RATE: 72 BPM | BODY MASS INDEX: 26.61 KG/M2 | WEIGHT: 132 LBS | SYSTOLIC BLOOD PRESSURE: 187 MMHG | TEMPERATURE: 99 F | DIASTOLIC BLOOD PRESSURE: 81 MMHG

## 2018-08-08 DIAGNOSIS — Z17.1 MALIGNANT NEOPLASM OF OVERLAPPING SITES OF RIGHT BREAST IN FEMALE, ESTROGEN RECEPTOR NEGATIVE: Primary | ICD-10-CM

## 2018-08-08 DIAGNOSIS — C50.811 MALIGNANT NEOPLASM OF OVERLAPPING SITES OF RIGHT BREAST IN FEMALE, ESTROGEN RECEPTOR NEGATIVE: Primary | ICD-10-CM

## 2018-08-08 PROCEDURE — 99999 PR PBB SHADOW E&M-EST. PATIENT-LVL III: CPT | Mod: PBBFAC,,, | Performed by: SURGERY

## 2018-08-08 PROCEDURE — 99213 OFFICE O/P EST LOW 20 MIN: CPT | Mod: S$GLB,,, | Performed by: SURGERY

## 2018-08-08 RX ORDER — ROSUVASTATIN CALCIUM 20 MG/1
TABLET, COATED ORAL
COMMUNITY
Start: 2018-07-06 | End: 2023-02-17

## 2018-08-08 NOTE — PROGRESS NOTES
"Date of Service:8/8/18    DIAGNOSIS:   This is a 72 y.o. female with a history of stage SulX8D1, grade high, ER -, [place] -, HER2 - DCIS of the right breast.    TREATMENT:   1. Right mastectomy with sentinel node biopsy on 1/11/18. Pema Mesa M.D. Surgical Oncology  2. No adjuvant chemotherapy or endocrine therapy indicated, saw Kaushik Ho M.D. Medical Oncology     HISTORY OF PRESENT ILLNESS:   Leigh Townsend is a 72 y.o. female comes in for oncological follow up.  She denies change in her breast self-exam specifically denying new masses, skin or nipple changes, or nipple discharge. Past medical and surgical history is updated with new changes. She states that she did have skin cancer diagnosed and removed on the right leg. She is unsure what type but says it is not melanoma. There have been no changes to family history. The patient denies constitutional symptoms of night sweats, weight loss, new headaches, visual changes, new back or bony pain, chest pain, or shortness of breath.      IMAGING:   None new     MEDICATIONS/ALLERGIES:   Review of patient's allergies indicates:  No Known Allergies    PHYSICAL EXAM:   BP (!) 187/81 (BP Location: Right arm, Patient Position: Sitting, BP Method: Medium (Automatic))   Pulse 72   Temp 98.6 °F (37 °C) (Oral)   Ht 4' 11" (1.499 m)   Wt 59.9 kg (132 lb)   LMP  (LMP Unknown)   BMI 26.66 kg/m²   General: The patient appears well and is in no acute distress.   Neuro: Alert & oriented x3. HEENT: PERRLA, EOMI, sclerae nonicteric. Mucous membranes moist.   Cardiovascular: RRR, no g/r/m.  Breasts: The exam was done with the patient seated and supine. Left breast - within normal limits. No palpable masses and no abnormal skin or nipple findings. No supraclavicular or axillary lymphadenopathy on the left side.   Right breast - within normal limits. No palpable masses and no abnormal skin or nipple findings. No supraclavicular or axillary lymphadenopathy on the right side.  Pulmonary: " clear to auscultation bilaterally   Abdomen: soft, nontender, nondistended. No masses.    Extremities: No clubbing or cyanosis. Bilateral full arm range of motion without  lymphedema.     ASSESSMENT:   This is a 72 y.o. female without evidence of recurrence by exam, history or imaging.       PLAN:   1. Continue to follow up with Dr. Ho as needed.  2. Continue monthly self breast exams and call the clinic with any changes or problems.  3. Mammogram in October 2018.  4. Return to clinic in 6 months for follow up, sooner if needed. The patient is in agreement with the plan. Questions were encouraged and answered to patient's satisfaction. Leigh will call our office with any questions or concerns.

## 2018-08-09 NOTE — PROGRESS NOTES
Breast Surgery  [facility]  Department of Surgery      REFERRING PROVIDER: No referring provider defined for this encounter.    Chief Complaint: Post-op Evaluation (Post-Op Surgery Right Breast Mastectomy Surgery 18)      Subjective:      Patient ID: Leigh Townsend is a 72 y.o. female who returns after R total mastectomy, SLNB.  Doing well with pain controlled but a significant portion of her incision did separate centrally.  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      She initially presented for clinical breast exam prior to scheduled biopsy.  She had an outside mammogram with new calcifications in a segmental distribution in the right breast upper outer quadrantthat are suspicious, BIRADS IV.  Her films have been reviewed and is scheduled for additional imaging today.  biopsies with findings of DCIS with microinvasive disease in 2 separate quadrants of the breast connected by suspicious calcifications.    Patient does not routinely do self breast exams.  Patient has not noted a change on breast exam.  Patient denies nipple discharge. Patient admits to to previous breast biopsy, excisional biopsy x4, all benign. Patient denies a personal history of breast cancer.    GYN History:  Age of menarche was 13. Age of menopause was early 50s.  Patient denies hormonal therapy. Patient is . Age of first live birth was 21s.     Past Medical History:   Diagnosis Date    Depression     Hypertension     Hypothyroid      Past Surgical History:   Procedure Laterality Date    BREAST BIOPSY      4 excisional biopsies     SECTION       Current Outpatient Prescriptions on File Prior to Visit   Medication Sig Dispense Refill    levothyroxine (SYNTHROID) 112 MCG tablet Take 1 tablet by mouth once daily.      losartan (COZAAR) 100 MG tablet Take 1 tablet by mouth every evening.       paroxetine (PAXIL) 40 MG tablet Take 1 tablet by mouth every evening.       traZODone (DESYREL) 100 MG tablet Take 1  Order stat urinalysis then see me for a quick visit   "tablet by mouth every evening.      clonazePAM (KLONOPIN) 2 MG Tab Take 1 tablet by mouth every evening.      oxyCODONE-acetaminophen (PERCOCET) 5-325 mg per tablet Take 1 tablet by mouth every 4 (four) hours as needed. 41 tablet 0     No current facility-administered medications on file prior to visit.      Social History     Social History    Marital status:      Spouse name: N/A    Number of children: N/A    Years of education: N/A     Occupational History    Not on file.     Social History Main Topics    Smoking status: Current Every Day Smoker     Packs/day: 0.50    Smokeless tobacco: Never Used    Alcohol use No    Drug use: Unknown    Sexual activity: Not on file     Other Topics Concern    Not on file     Social History Narrative    No narrative on file     Family History   Problem Relation Age of Onset    Cancer Paternal Grandmother      throat cancer    Cancer Paternal Grandfather      prostate cancer        Review of Systems   Constitutional: Negative for appetite change, chills, fever and unexpected weight change.   HENT: Negative for facial swelling, postnasal drip and sore throat.    Eyes: Negative for redness and itching.   Respiratory: Negative for chest tightness and shortness of breath.    Cardiovascular: Negative for chest pain and palpitations.   Gastrointestinal: Negative for blood in stool, diarrhea, nausea and vomiting.   Genitourinary: Negative for difficulty urinating and dysuria.   Musculoskeletal: Negative for arthralgias and joint swelling.   Skin: Negative for rash and wound.   Neurological: Negative for dizziness and syncope.   Hematological: Negative for adenopathy.   Psychiatric/Behavioral: Negative for agitation. The patient is not nervous/anxious.      Objective:   BP (!) 190/82 (BP Location: Left arm, Patient Position: Sitting, BP Method: Medium (Automatic))   Pulse 75   Temp 98.8 °F (37.1 °C) (Oral)   Ht 4' 11" (1.499 m)   Wt 57.6 kg (127 lb)   LMP  (LMP " Unknown)   BMI 25.65 kg/m²     Physical Exam   Constitutional: She appears well-developed and well-nourished.   HENT:   Head: Normocephalic.   Eyes: No scleral icterus.   Neck: Neck supple. No tracheal deviation present.   Cardiovascular: Normal rate and regular rhythm.    Pulmonary/Chest: Breath sounds normal. No respiratory distress. Right breast exhibits no inverted nipple, no mass, no nipple discharge and no skin change. Left breast exhibits no inverted nipple, no mass, no nipple discharge and no skin change.       Abdominal: Soft. She exhibits no mass. There is no tenderness.   Musculoskeletal: She exhibits no edema.   Lymphadenopathy:     She has no cervical adenopathy.   Neurological: She is alert.   Skin: No rash noted. No erythema.     Psychiatric: She has a normal mood and affect.       Radiology review: Images personally reviewed by me in the clinic.   Mammogram:  Findings:     Bilateral screening mammogram 10/25/17: The breasts are heterogeneously dense, which may obscure small masses. There are indeterminate calcifications in the right breast. There are stable presumed post-surgical changes in both breasts with radiopaque linear scar markers on the overlying skin. No suspicious finding in the left breast.      Right breast mammogram 11/9/17: Additional evaluation with spot magnification mammography was performed for the right breast calcifications. In the right breast upper outer quadrant, fine linear and fine linear branching calcifications are present, spanning the anterior to middle depths and extending to within 1 cm of the nipple. The calcifications are segmentally distributed, measuring 6.5 x 4.7 x 4.4 cm ([id number]).      Impression:  1) Right breast upper outer quadrant segmental fine linear branching calcifications spanning the anterior to middle depths and extending to within 1 cm of the nipple. Greatest linear extent of involvement is 6.5 cm. Assessment: 4C - Suspicious finding. Biopsy is  recommended. Prior to biopsy, limited right breast ultrasound (including assessment of the right axilla) is recommended.     2) No suspicious finding in the left breast.     BI-RADS Category:   Overall: 4C - High Suspicion for Malignancy     Recommendation:  Right breast limited ultrasound, including assessment of the right axilla.     Single site right breast biopsy of the most suspicious area of calcifications.      PAFINAL PATHOLOGIC DIAGNOSIS  1. CORE BIOPSIES OF RIGHT BREAST:  DUCTAL CARCINOMA IN SITU WITH MICROINVASION SEE DESCRIPTION  HORMONE RECEPTOR STUDIES:  No nuclear estrogen receptor, nuclear progesterone receptor, or HER-2 positivity is identified in the small focus  interpreted as invasive carcinoma. The positive and negative controls stained appropriately. Because this area is  so small, it is my recommendation that these studies be repeated if additional infiltrating carcinoma would be found  in a subsequent resection specimen.  2. CORE BIOPSIES OF RIGHT BREAST:  DUCTAL CARCINOMA IN SITU  3. CORE BIOPSIES OF AXILLARY LYMPH NODE:  NO METASTATIC CARCINOMA IDENTIFIED  [id number]  Diagnosed by: Jeffery Noriega M.D.  (Electronically Signed: 2017-12-11 09:22:32)  Microscopic Examination  1. There are multiple areas of high-grade ductal carcinoma in situ. Microcalcifications are present. In addition there is  a 1 mm cluster with irregular outlines. It is my interpretation that this focus reflects an infiltrating carcinoma. This area  does not have a basal layer with the p63 stain. In addition there are some very small adjacent clusters in a pattern of  infiltrating carcinoma.  3. No metastatic carcinoma is identified in the multiple levels of these core biopsies of lymphoid tissue area.      Assessment:       DCIS with microinvasion    Betadine prep and peroxide solution used to sterilize the field.  3-0 nylon vertical mattress sutures placed.    Hormone negative cancer so will not need hormonal  therapy    Return in 2 weeks for suture removal.

## 2018-08-27 DIAGNOSIS — Z85.3 HX OF BREAST CANCER: Primary | ICD-10-CM

## 2018-10-24 ENCOUNTER — TELEPHONE (OUTPATIENT)
Dept: SURGERY | Facility: CLINIC | Age: 73
End: 2018-10-24

## 2018-10-24 NOTE — TELEPHONE ENCOUNTER
Call to pt to call back regarding mammogram of left breast that is due. Appt scheduled on 11/13/18 at 1:00 pm at [facility] in [place]. Will await call back from pt. [facility] requests any films from [facility] to be sent to them. Will have disc with reports sent.

## 2018-10-26 ENCOUNTER — TELEPHONE (OUTPATIENT)
Dept: SURGERY | Facility: CLINIC | Age: 73
End: 2018-10-26

## 2018-10-26 NOTE — TELEPHONE ENCOUNTER
Call from pt after leaving her a message regarding mammogram at [facility]. Appt for mammogram 11/13/18 at 1:00 pm. Informed pt that [facility] requested [facility] films, but all [facility] had was right breast films, which right breast has been removed.

## 2018-10-30 ENCOUNTER — TELEPHONE (OUTPATIENT)
Dept: SURGERY | Facility: CLINIC | Age: 73
End: 2018-10-30

## 2018-10-30 NOTE — TELEPHONE ENCOUNTER
----- Message from Desmond Bhatt sent at 10/30/2018  2:53 PM CDT -----  Contact: pt   Needs Advice    Reason for call: would like a call regarding paper work she has questions on.         Communication Preference: [phone]     Additional Information:

## 2018-11-19 ENCOUNTER — TELEPHONE (OUTPATIENT)
Dept: SURGERY | Facility: CLINIC | Age: 73
End: 2018-11-19

## 2018-11-19 NOTE — TELEPHONE ENCOUNTER
----- Message from Leyn Moreno sent at 11/19/2018 11:43 AM CST -----  Contact: Patient   Needs Advice    Reason for call: Patient needs an Rx for the bras she is buying to receive a reimbursement from medicare.         Communication Preference:  [phone]    Additional Information: Please contact the patient, she is requesting to speak with Kelvin

## 2018-11-19 NOTE — TELEPHONE ENCOUNTER
Spoke with pt. States she needs a prescription for breast prosthesis and bras. Will send the letter she received from the company to let us know what exactly is needed in the order. Will await letter from pt and get Rx order for prosthesis and bras mailed backed to her.

## 2018-11-27 DIAGNOSIS — C50.811 MALIGNANT NEOPLASM OF OVERLAPPING SITES OF RIGHT BREAST IN FEMALE, ESTROGEN RECEPTOR NEGATIVE: Primary | ICD-10-CM

## 2018-11-27 DIAGNOSIS — Z17.1 MALIGNANT NEOPLASM OF OVERLAPPING SITES OF RIGHT BREAST IN FEMALE, ESTROGEN RECEPTOR NEGATIVE: Primary | ICD-10-CM

## 2018-11-27 DIAGNOSIS — Z90.11 ACQUIRED ABSENCE OF RIGHT BREAST AND NIPPLE: ICD-10-CM

## 2020-01-20 ENCOUNTER — TELEPHONE (OUTPATIENT)
Dept: SURGERY | Facility: CLINIC | Age: 75
End: 2020-01-20

## 2020-01-20 NOTE — TELEPHONE ENCOUNTER
----- Message from Fab Randle sent at 1/20/2020  9:28 AM CST -----  Contact: Pt      The Pt would like a call back about the surgery she had.  She would like for Kelvin to call her back.    Phone # [phone]

## 2020-01-20 NOTE — TELEPHONE ENCOUNTER
Pt returns my call. Pt had called in earlier today and had questions regarding the receptors on her cancer. Pt states her daughter was asking as she is having hot flashes and wanted to take natural estrogens. Informed pt that her daughter should see an MD for a recommendation. Pt states that her daughter is seeing someone, but wanted to know if her cancer was estrogen driven. Informed pt that the receptors done on her cancer did not give enough information to know for sure as there was only a small amount of invasive cancer, but per the report, No nuclear estrogen receptor, nuclear progesterone receptor, or HER-2 positivity is identified in the small focus  interpreted as invasive carcinoma. The positive and negative controls stained appropriately. Because this area is  so small, it is my recommendation that these studies be repeated if additional infiltrating carcinoma would be found  in a subsequent resection specimen.   Per the surgical path, No nuclear estrogen receptor, nuclear progesterone receptor, or HER-2 positivity is identified in the small focus  interpreted as invasive carcinoma.

## 2020-08-03 ENCOUNTER — OFFICE VISIT (OUTPATIENT)
Dept: OBSTETRICS AND GYNECOLOGY | Facility: CLINIC | Age: 75
End: 2020-08-03
Payer: MEDICARE

## 2020-08-03 VITALS
HEIGHT: 58 IN | SYSTOLIC BLOOD PRESSURE: 120 MMHG | BODY MASS INDEX: 27.71 KG/M2 | DIASTOLIC BLOOD PRESSURE: 72 MMHG | HEART RATE: 78 BPM | WEIGHT: 132 LBS | RESPIRATION RATE: 17 BRPM

## 2020-08-03 DIAGNOSIS — N95.1 MENOPAUSAL STATE: ICD-10-CM

## 2020-08-03 DIAGNOSIS — Z17.1 MALIGNANT NEOPLASM OF OVERLAPPING SITES OF RIGHT BREAST IN FEMALE, ESTROGEN RECEPTOR NEGATIVE: Primary | ICD-10-CM

## 2020-08-03 DIAGNOSIS — C50.811 MALIGNANT NEOPLASM OF OVERLAPPING SITES OF RIGHT BREAST IN FEMALE, ESTROGEN RECEPTOR NEGATIVE: Primary | ICD-10-CM

## 2020-08-03 DIAGNOSIS — Z01.419 WELL WOMAN EXAM WITH ROUTINE GYNECOLOGICAL EXAM: ICD-10-CM

## 2020-08-03 PROCEDURE — 99999 PR PBB SHADOW E&M-EST. PATIENT-LVL III: ICD-10-PCS | Mod: PBBFAC,,, | Performed by: ADVANCED PRACTICE MIDWIFE

## 2020-08-03 PROCEDURE — G0101 PR CA SCREEN;PELVIC/BREAST EXAM: ICD-10-PCS | Mod: S$GLB,,, | Performed by: ADVANCED PRACTICE MIDWIFE

## 2020-08-03 PROCEDURE — G0101 CA SCREEN;PELVIC/BREAST EXAM: HCPCS | Mod: S$GLB,,, | Performed by: ADVANCED PRACTICE MIDWIFE

## 2020-08-03 PROCEDURE — 99999 PR PBB SHADOW E&M-EST. PATIENT-LVL III: CPT | Mod: PBBFAC,,, | Performed by: ADVANCED PRACTICE MIDWIFE

## 2020-08-03 RX ORDER — HYDROCHLOROTHIAZIDE 12.5 MG/1
12.5 CAPSULE ORAL DAILY
COMMUNITY
Start: 2020-05-08 | End: 2023-02-17

## 2020-08-03 RX ORDER — TRAZODONE HYDROCHLORIDE 150 MG/1
150 TABLET ORAL NIGHTLY
COMMUNITY
Start: 2020-06-26 | End: 2023-02-17

## 2020-08-03 RX ORDER — LEVOTHYROXINE SODIUM 125 UG/1
125 TABLET ORAL DAILY
COMMUNITY
Start: 2020-06-05 | End: 2023-02-17

## 2020-08-03 RX ORDER — SERTRALINE HYDROCHLORIDE 100 MG/1
100 TABLET, FILM COATED ORAL NIGHTLY
COMMUNITY
Start: 2020-06-07 | End: 2023-02-17

## 2020-08-03 NOTE — PROGRESS NOTES
"  Subjective:    Patient ID: Leigh Townsend is a 74 y.o. y.o. female.     Chief Complaint: Annual Well Woman Exam     History of Present Illness:  Leigh presents today for Annual Well Woman exam. She describes her menses as menopausal .She denies pelvic pain.  She denies breast tenderness, masses, nipple discharge. She denies difficulty with urination or bowel movements. She denies menopausal symptoms such as hotflashes, vaginal dryness, and night sweats. She denies bloating, early satiety, or weight changes. She is not sexually active. The following portions of the patient's history were reviewed and updated as appropriate: allergies, current medications, past family history, past medical history, past social history, past surgical history and problem list.  mmg MASTECTOMY right sided,  Colonoscopy 2019,   DEXA  2 years ago     Menstrual History:   No LMP recorded (lmp unknown). Patient is postmenopausal..     OB History        1    Para   1    Term   1            AB        Living           SAB        TAB        Ectopic        Multiple        Live Births                     The following portions of the patient's history were reviewed and updated as appropriate: allergies, current medications, past family history, past medical history, past social history, past surgical history and problem list.        ROS:     Review of Systems   Genitourinary: Negative for dysuria, flank pain, frequency, hematuria, hot flashes, pelvic pain and vaginal discharge.   All other systems reviewed and are negative.      Objective:    Vital Signs:  Vitals:    20 1050   Weight: 59.9 kg (132 lb)   Height: 4' 9.5" (1.461 m)         Physical Exam:  General:  alert, cooperative, appears stated age   Skin:  Skin color, texture, turgor normal. No rashes or lesions   HEENT:  conjunctivae/corneas clear. PERRL.   Neck: supple, trachea midline, no adenopathy or thyromegally   Respiratory:  clear to auscultation bilaterally   Heart:  " regular rate and rhythm, S1, S2 normal, no murmur, click, rub or gallop   Breasts:  no discharge, erythema, or tenderness, patient is lactating, no areas of erythema or tenderness noted, nipples are normal, mastectomy site well healed without palpable abnormalities   Abdomen:  normal findings: bowel sounds normal, no masses palpable, no organomegaly and soft, non-tender   Pelvis: External genitalia: normal general appearance  Urinary system: urethral meatus normal, bladder nontender  Vaginal: normal mucosa without prolapse or lesions  Cervix: normal appearance  Uterus: normal size, shape, position  Adnexa: normal size, nontender bilaterally   Extremities: Normal ROM; no edema, no cyanosis   Neurologial: Normal strength and tone. No focal numbness or weakness. Reflexes 2+ and equal.   Psychiatric: normal mood, speech, dress, and thought processes         Assessment:       Healthy female exam.     1. Malignant neoplasm of overlapping sites of right breast in female, estrogen receptor negative    2. Well woman exam with routine gynecological exam    3. Menopausal state          Plan:       All questions answered.  Breast self exam technique reviewed and patient encouraged to perform self-exam monthly.  Follow up in 1 year.  Follow up as needed.  Mammogram.    DEXA next year as per pt request was done 2 years ago     COUNSELING:  Leigh was counseled on  A.C.O.G. Pap guidelines and recommendations for yearly pelvic exams in addition to recommendations for monthly self breast exams; to see her PCP for other health maintenance.

## 2021-04-13 NOTE — TELEPHONE ENCOUNTER
Called patient after receiving referral from Dr. Shay Voss at [facility] for abnormal mammo/possible biopsy. Left voicemail with my callback number requesting return call at earliest convenience  
show

## 2021-12-06 ENCOUNTER — HISTORICAL (OUTPATIENT)
Dept: ADMINISTRATIVE | Facility: HOSPITAL | Age: 76
End: 2021-12-06

## 2023-02-09 ENCOUNTER — OFFICE VISIT (OUTPATIENT)
Dept: URGENT CARE | Facility: CLINIC | Age: 78
End: 2023-02-09
Payer: MEDICARE

## 2023-02-09 VITALS
DIASTOLIC BLOOD PRESSURE: 75 MMHG | RESPIRATION RATE: 16 BRPM | HEART RATE: 67 BPM | OXYGEN SATURATION: 97 % | WEIGHT: 120.63 LBS | HEIGHT: 57 IN | SYSTOLIC BLOOD PRESSURE: 171 MMHG | BODY MASS INDEX: 26.03 KG/M2 | TEMPERATURE: 98 F

## 2023-02-09 DIAGNOSIS — J01.90 ACUTE SINUSITIS, RECURRENCE NOT SPECIFIED, UNSPECIFIED LOCATION: ICD-10-CM

## 2023-02-09 DIAGNOSIS — N39.0 URINARY TRACT INFECTION WITHOUT HEMATURIA, SITE UNSPECIFIED: Primary | ICD-10-CM

## 2023-02-09 DIAGNOSIS — R35.0 FREQUENT URINATION: ICD-10-CM

## 2023-02-09 LAB
BILIRUB UR QL STRIP: POSITIVE
GLUCOSE UR QL STRIP: NEGATIVE
KETONES UR QL STRIP: NEGATIVE
LEUKOCYTE ESTERASE UR QL STRIP: POSITIVE
PH, POC UA: 6.5
POC BLOOD, URINE: NEGATIVE
POC NITRATES, URINE: NEGATIVE
PROT UR QL STRIP: POSITIVE
SP GR UR STRIP: 1.01 (ref 1–1.03)
UROBILINOGEN UR STRIP-ACNC: 2 (ref 0.1–1.1)

## 2023-02-09 PROCEDURE — 3078F PR MOST RECENT DIASTOLIC BLOOD PRESSURE < 80 MM HG: ICD-10-PCS | Mod: CPTII,,, | Performed by: PHYSICIAN ASSISTANT

## 2023-02-09 PROCEDURE — 1159F PR MEDICATION LIST DOCUMENTED IN MEDICAL RECORD: ICD-10-PCS | Mod: CPTII,,, | Performed by: PHYSICIAN ASSISTANT

## 2023-02-09 PROCEDURE — 1159F MED LIST DOCD IN RCRD: CPT | Mod: CPTII,,, | Performed by: PHYSICIAN ASSISTANT

## 2023-02-09 PROCEDURE — 1160F PR REVIEW ALL MEDS BY PRESCRIBER/CLIN PHARMACIST DOCUMENTED: ICD-10-PCS | Mod: CPTII,,, | Performed by: PHYSICIAN ASSISTANT

## 2023-02-09 PROCEDURE — 81003 URINALYSIS AUTO W/O SCOPE: CPT | Mod: QW,,, | Performed by: PHYSICIAN ASSISTANT

## 2023-02-09 PROCEDURE — 3077F SYST BP >= 140 MM HG: CPT | Mod: CPTII,,, | Performed by: PHYSICIAN ASSISTANT

## 2023-02-09 PROCEDURE — 3077F PR MOST RECENT SYSTOLIC BLOOD PRESSURE >= 140 MM HG: ICD-10-PCS | Mod: CPTII,,, | Performed by: PHYSICIAN ASSISTANT

## 2023-02-09 PROCEDURE — 99203 PR OFFICE/OUTPT VISIT, NEW, LEVL III, 30-44 MIN: ICD-10-PCS | Mod: ,,, | Performed by: PHYSICIAN ASSISTANT

## 2023-02-09 PROCEDURE — 99203 OFFICE O/P NEW LOW 30 MIN: CPT | Mod: ,,, | Performed by: PHYSICIAN ASSISTANT

## 2023-02-09 PROCEDURE — 81003 POCT URINALYSIS, DIPSTICK, AUTOMATED, W/O SCOPE: ICD-10-PCS | Mod: QW,,, | Performed by: PHYSICIAN ASSISTANT

## 2023-02-09 PROCEDURE — 3078F DIAST BP <80 MM HG: CPT | Mod: CPTII,,, | Performed by: PHYSICIAN ASSISTANT

## 2023-02-09 PROCEDURE — 1160F RVW MEDS BY RX/DR IN RCRD: CPT | Mod: CPTII,,, | Performed by: PHYSICIAN ASSISTANT

## 2023-02-09 PROCEDURE — 87088 URINE BACTERIA CULTURE: CPT | Performed by: PHYSICIAN ASSISTANT

## 2023-02-09 RX ORDER — CEFDINIR 300 MG/1
300 CAPSULE ORAL 2 TIMES DAILY
Qty: 20 CAPSULE | Refills: 0 | Status: SHIPPED | OUTPATIENT
Start: 2023-02-09 | End: 2023-02-19

## 2023-02-09 RX ORDER — FLUTICASONE PROPIONATE 50 MCG
2 SPRAY, SUSPENSION (ML) NASAL DAILY
Qty: 18.2 ML | Refills: 0 | Status: SHIPPED | OUTPATIENT
Start: 2023-02-09 | End: 2023-02-17

## 2023-02-09 NOTE — PROGRESS NOTES
"Subjective:       Patient ID: Leigh Townsend is a 77 y.o. female.    Vitals:  height is 4' 9" (1.448 m) and weight is 54.7 kg (120 lb 9.6 oz). Her temperature is 98.3 °F (36.8 °C). Her blood pressure is 171/75 (abnormal) and her pulse is 67. Her respiration is 16 and oxygen saturation is 97%.     Chief Complaint: Urinary Frequency (Burning when urinating, Pt states feeling of full bladder, symptoms have been present for several months. No OTC medication taken today.) and Ear Problem (Pt states both her ears have been giving trouble. Unable to describe sensation. Denies presence of pain, draining, and/or ringing.)    Burning when urinating, Pt states feeling of full bladder, symptoms have been present for several months. No OTC medication taken today.  Pt states both her ears have been giving trouble. Unable to describe sensation.  She does report some nasal congestion and sinus pressure.  Denies any fever flank pain or vomiting.    Urinary Frequency   Associated symptoms include frequency.     Genitourinary:  Positive for frequency.     Objective:      Physical Exam   Constitutional: She is oriented to person, place, and time. She appears well-developed. She is cooperative.  Non-toxic appearance. She does not appear ill. No distress.   HENT:   Head: Normocephalic and atraumatic.   Ears:   Right Ear: Hearing, tympanic membrane, external ear and ear canal normal.   Left Ear: Hearing, tympanic membrane, external ear and ear canal normal.   Nose: Nose normal. No nasal deformity. No epistaxis.   Mouth/Throat: Uvula is midline, oropharynx is clear and moist and mucous membranes are normal. No trismus in the jaw. Normal dentition. No uvula swelling. No oropharyngeal exudate, posterior oropharyngeal edema or posterior oropharyngeal erythema.   Eyes: Conjunctivae and lids are normal. No scleral icterus.   Neck: Trachea normal and phonation normal. Neck supple. No edema present. No erythema present. No neck rigidity present. "   Cardiovascular: Normal rate, regular rhythm, normal heart sounds and normal pulses.   Pulmonary/Chest: Effort normal and breath sounds normal. No respiratory distress. She has no decreased breath sounds. She has no rhonchi.   Abdominal: Normal appearance and bowel sounds are normal. She exhibits no distension. flat abdomen There is no abdominal tenderness. There is no guarding, no left CVA tenderness and no right CVA tenderness.   Musculoskeletal: Normal range of motion.         General: No deformity. Normal range of motion.   Neurological: She is alert and oriented to person, place, and time. She exhibits normal muscle tone. Coordination normal.   Skin: Skin is warm, dry, intact, not diaphoretic and not pale.   Psychiatric: Her speech is normal and behavior is normal. Judgment and thought content normal.   Nursing note and vitals reviewed.             Previous History      Review of patient's allergies indicates:  No Known Allergies    Past Medical History:   Diagnosis Date    Depression     History of right breast cancer     Hypertension     Hypothyroid      Current Outpatient Medications   Medication Instructions    cefdinir (OMNICEF) 300 mg, Oral, 2 times daily    clonazePAM (KLONOPIN) 2 mg, Oral, 2 times daily    fluticasone propionate (FLONASE) 100 mcg, Each Nostril, Daily    hydroCHLOROthiazide (MICROZIDE) 12.5 mg, Oral, Daily    levothyroxine (SYNTHROID) 125 mcg, Oral, Daily    rosuvastatin (CRESTOR) 20 MG tablet No dose, route, or frequency recorded.    sertraline (ZOLOFT) 100 mg, Oral, Nightly    traZODone (DESYREL) 150 mg, Oral, Nightly     Past Surgical History:   Procedure Laterality Date    BREAST BIOPSY      4 excisional biopsies     SECTION      MASTECTOMY Right      Family History   Problem Relation Age of Onset    No Known Problems Mother     No Known Problems Father     No Known Problems Sister     No Known Problems Brother     Cancer Paternal Grandmother         throat cancer    Cancer  "Paternal Grandfather         prostate cancer    Breast cancer Neg Hx     Colon cancer Neg Hx     Ovarian cancer Neg Hx        Social History     Tobacco Use    Smoking status: Every Day     Packs/day: 0.50     Types: Cigarettes    Smokeless tobacco: Never   Substance Use Topics    Alcohol use: No    Drug use: Never        Physical Exam      Vital Signs Reviewed   BP (!) 171/75   Pulse 67   Temp 98.3 °F (36.8 °C)   Resp 16   Ht 4' 9" (1.448 m)   Wt 54.7 kg (120 lb 9.6 oz)   LMP  (LMP Unknown)   SpO2 97%   BMI 26.10 kg/m²        Procedures    Procedures     Labs     Results for orders placed or performed in visit on 02/09/23   POCT Urinalysis, Dipstick, Automated, W/O Scope   Result Value Ref Range    POC Blood, Urine Negative Negative    POC Bilirubin, Urine Positive (A) Negative    POC Urobilinogen, Urine 2 (A) 0.1 - 1.1    POC Ketones, Urine Negative Negative    POC Protein, Urine Positive (A) Negative    POC Nitrates, Urine Negative Negative    POC Glucose, Urine Negative Negative    pH, UA 6.5     POC Specific Gravity, Urine 1.010 1.003 - 1.029    POC Leukocytes, Urine Positive (A) Negative     Assessment:       1. Urinary tract infection without hematuria, site unspecified    2. Frequent urination    3. Acute sinusitis, recurrence not specified, unspecified location          Plan:         Urinary tract infection without hematuria, site unspecified  -     Urine culture  -     cefdinir (OMNICEF) 300 MG capsule; Take 1 capsule (300 mg total) by mouth 2 (two) times daily. for 10 days  Dispense: 20 capsule; Refill: 0    Frequent urination  -     POCT Urinalysis, Dipstick, Automated, W/O Scope    Acute sinusitis, recurrence not specified, unspecified location  -     cefdinir (OMNICEF) 300 MG capsule; Take 1 capsule (300 mg total) by mouth 2 (two) times daily. for 10 days  Dispense: 20 capsule; Refill: 0  -     fluticasone propionate (FLONASE) 50 mcg/actuation nasal spray; 2 sprays (100 mcg total) by Each " Nostril route once daily.  Dispense: 18.2 mL; Refill: 0    Complete full course of antibiotics.      Drink plenty of water daily. Avoid soda.    Follow up with your primary care doctor as needed.    Present to the nearest Emergency Department with any significant change or worsening of symptoms including but not limited to blood in urine, nausea/vomiting, abdominal pain, fever, body aches, chills, or flank pain.

## 2023-02-09 NOTE — PATIENT INSTRUCTIONS
Complete full course of antibiotics.      Drink plenty of water daily. Avoid soda.    Follow up with your primary care doctor as needed.    Present to the nearest Emergency Department with any significant change or worsening of symptoms including but not limited to blood in urine, nausea/vomiting, abdominal pain, fever, body aches, chills, or flank pain.     
82

## 2023-02-11 LAB — BACTERIA UR CULT: NO GROWTH

## 2023-02-14 ENCOUNTER — TELEPHONE (OUTPATIENT)
Dept: URGENT CARE | Facility: CLINIC | Age: 78
End: 2023-02-14
Payer: MEDICARE

## 2023-02-14 NOTE — TELEPHONE ENCOUNTER
Spoke to patient's daughter about results; daughter voiced understanding.            ----- Message from Madhav Burciaga PA-C sent at 2/13/2023  4:58 PM CST -----  Status: Final result     Visible to patient: No (inaccessible in Patient Portal)     Next appt: None     Dx: Urinary tract infection without hemat...     Specimen Information: Urine, Clean Catch  0 Result Notes  Urine Culture, Routine   No Growth       F/u prn.       ----- Message -----  From: Gisel Field MA  Sent: 2/13/2023   4:34 PM CST  To: [facility] Results

## 2023-03-27 ENCOUNTER — TELEPHONE (OUTPATIENT)
Dept: UROGYNECOLOGY | Facility: CLINIC | Age: 78
End: 2023-03-27

## 2023-04-03 ENCOUNTER — OFFICE VISIT (OUTPATIENT)
Dept: UROGYNECOLOGY | Facility: CLINIC | Age: 78
End: 2023-04-03
Payer: MEDICARE

## 2023-04-03 VITALS
RESPIRATION RATE: 18 BRPM | BODY MASS INDEX: 24 KG/M2 | WEIGHT: 119.06 LBS | SYSTOLIC BLOOD PRESSURE: 165 MMHG | HEART RATE: 82 BPM | DIASTOLIC BLOOD PRESSURE: 87 MMHG | HEIGHT: 59 IN

## 2023-04-03 DIAGNOSIS — R10.2 PELVIC PRESSURE IN FEMALE: ICD-10-CM

## 2023-04-03 DIAGNOSIS — R39.15 URINARY URGENCY: Primary | ICD-10-CM

## 2023-04-03 DIAGNOSIS — N95.2 VAGINAL ATROPHY: ICD-10-CM

## 2023-04-03 LAB
APPEARANCE UR: CLEAR
BACTERIA #/AREA URNS AUTO: NORMAL /HPF
BILIRUB UR QL STRIP.AUTO: NEGATIVE MG/DL
COLOR UR AUTO: YELLOW
GLUCOSE UR QL STRIP.AUTO: NEGATIVE MG/DL
KETONES UR QL STRIP.AUTO: NEGATIVE MG/DL
LEUKOCYTE ESTERASE UR QL STRIP.AUTO: NEGATIVE UNIT/L
NITRITE UR QL STRIP.AUTO: NEGATIVE
PH UR STRIP.AUTO: 5.5 [PH]
PROT UR QL STRIP.AUTO: NEGATIVE MG/DL
RBC #/AREA URNS AUTO: <5 /HPF
RBC UR QL AUTO: NEGATIVE UNIT/L
SP GR UR STRIP.AUTO: 1.03 (ref 1–1.03)
SQUAMOUS #/AREA URNS AUTO: <5 /HPF
UROBILINOGEN UR STRIP-ACNC: 0.2 MG/DL
WBC #/AREA URNS AUTO: <5 /HPF

## 2023-04-03 PROCEDURE — 99215 PR OFFICE/OUTPT VISIT, EST, LEVL V, 40-54 MIN: ICD-10-PCS | Mod: 25,S$GLB,, | Performed by: OBSTETRICS & GYNECOLOGY

## 2023-04-03 PROCEDURE — 3079F DIAST BP 80-89 MM HG: CPT | Mod: CPTII,S$GLB,, | Performed by: OBSTETRICS & GYNECOLOGY

## 2023-04-03 PROCEDURE — 81001 URINALYSIS AUTO W/SCOPE: CPT | Performed by: OBSTETRICS & GYNECOLOGY

## 2023-04-03 PROCEDURE — 1101F PT FALLS ASSESS-DOCD LE1/YR: CPT | Mod: CPTII,S$GLB,, | Performed by: OBSTETRICS & GYNECOLOGY

## 2023-04-03 PROCEDURE — 3079F PR MOST RECENT DIASTOLIC BLOOD PRESSURE 80-89 MM HG: ICD-10-PCS | Mod: CPTII,S$GLB,, | Performed by: OBSTETRICS & GYNECOLOGY

## 2023-04-03 PROCEDURE — 51798 US URINE CAPACITY MEASURE: CPT | Mod: S$GLB,,, | Performed by: OBSTETRICS & GYNECOLOGY

## 2023-04-03 PROCEDURE — 3077F SYST BP >= 140 MM HG: CPT | Mod: CPTII,S$GLB,, | Performed by: OBSTETRICS & GYNECOLOGY

## 2023-04-03 PROCEDURE — 3077F PR MOST RECENT SYSTOLIC BLOOD PRESSURE >= 140 MM HG: ICD-10-PCS | Mod: CPTII,S$GLB,, | Performed by: OBSTETRICS & GYNECOLOGY

## 2023-04-03 PROCEDURE — 51798 PR MEAS,POST-VOID RES,US,NON-IMAGING: ICD-10-PCS | Mod: S$GLB,,, | Performed by: OBSTETRICS & GYNECOLOGY

## 2023-04-03 PROCEDURE — 99417 PROLNG OP E/M EACH 15 MIN: CPT | Mod: S$GLB,,, | Performed by: OBSTETRICS & GYNECOLOGY

## 2023-04-03 PROCEDURE — 1101F PR PT FALLS ASSESS DOC 0-1 FALLS W/OUT INJ PAST YR: ICD-10-PCS | Mod: CPTII,S$GLB,, | Performed by: OBSTETRICS & GYNECOLOGY

## 2023-04-03 PROCEDURE — 3288F PR FALLS RISK ASSESSMENT DOCUMENTED: ICD-10-PCS | Mod: CPTII,S$GLB,, | Performed by: OBSTETRICS & GYNECOLOGY

## 2023-04-03 PROCEDURE — 3288F FALL RISK ASSESSMENT DOCD: CPT | Mod: CPTII,S$GLB,, | Performed by: OBSTETRICS & GYNECOLOGY

## 2023-04-03 PROCEDURE — 99417 PR PROLONGED SVC, OUTPT, W/WO DIRECT PT CONTACT,  EA ADDTL 15 MIN: ICD-10-PCS | Mod: S$GLB,,, | Performed by: OBSTETRICS & GYNECOLOGY

## 2023-04-03 PROCEDURE — 99215 OFFICE O/P EST HI 40 MIN: CPT | Mod: 25,S$GLB,, | Performed by: OBSTETRICS & GYNECOLOGY

## 2023-04-03 PROCEDURE — 1159F MED LIST DOCD IN RCRD: CPT | Mod: CPTII,S$GLB,, | Performed by: OBSTETRICS & GYNECOLOGY

## 2023-04-03 PROCEDURE — 1159F PR MEDICATION LIST DOCUMENTED IN MEDICAL RECORD: ICD-10-PCS | Mod: CPTII,S$GLB,, | Performed by: OBSTETRICS & GYNECOLOGY

## 2023-04-03 RX ORDER — ESTRADIOL 0.1 MG/G
CREAM VAGINAL
Qty: 42.5 G | Refills: 10 | Status: SHIPPED | OUTPATIENT
Start: 2023-04-03 | End: 2023-08-15

## 2023-04-03 RX ORDER — ASPIRIN 81 MG/1
1 TABLET ORAL DAILY
COMMUNITY

## 2023-04-03 NOTE — PROGRESS NOTES
PELVIC MEDICINE AND RECONSTRUCTIVE SURGERY CONSULT NOTE    CHIEF COMPLAINT:  Consultation requested regarding urinary frequency, pelvic pressure    HISTORY OF PRESENT ILLNESS:   Leigh Townsend is a 77 y.o. female  Patient reports first noticing symptoms .      Patient states she feels a lot of pelvic pressure and urinary urgency/frequency.    Patient denies urine leakage.    Patient believes that she has pelvic organ prolapse because of the suprapubic pressure that she is experiencing.  Patient states she looked it up on Google and she read that most likely the cause of her pelvic pressure with prolapse.  She presents today for physical exam to confirm prolapse and to find the etiology of her pelvic pressure.  She also states ever since she had her gallbladder taken out, she has diarrhea every day.  She takes Imodium to control her symptoms, however She states this is not adequate and she still has multiple porridge it is like bowel movements daily.  She states she is not been seen by GI yet.    Voids during the day: q1-2h  Voids at nighttime: unclear, states she doesn't sleep  Leakage of urine with coughing or exercise: Yes   -previous surgery? No  Leakage of urine with urgency: Yes - wears depends overnight pads.   Pad for leakage of urine:yes   -how often do you change your pad?  Damp pads.  Changes as needed  Sensation of incomplete emptying: Yes  Splinting to void/BM: No  History of kidney stones No  Hematuria No  > 3 UTIs in 12 months  No  - symptoms with UTI? N/A    Glasses/ounces of water in 1 day: 1 glass  Cups of coffee/tea/soda in 1 day: 3 small sodas  Bowel movements in 1 week: 30  Constipation/straining: yes  Diarrhea: yes - since her GB surgery.  On Immodium  Fecal incontinence: No  Fecal urgency: Yes  Fecal smearing: No    Patient  reports symptoms of a vaginal bulge.  Size of the bulge:  unknown  Bulge becoming progressively worse over time: no  Bulge limits physical activity: no    Tried a pessary:  no  Tried Kegels: No  Prior Surgery for prolapse: No  Prior Surgery for incontinence: No    Sexually active: no  Pain with sex: not applicable  Vaginal dryness: yes  Loss of urine or stool with sexual activity:not applicable    She denies vaginal bleeding, vaginal discharge, dysuria, hematuria,   Patient also reports abdominal or pelvic pain.    Patient denies back injury or back pain  Patient reports lower extremity numbness, tingling      Gyn Hx:  Patient reports h/o Normal paps; denies h/o Fibroids, denies h/o Endometriosis, denies h/o Ovarian Cysts, denies h/o abnormal paps, denies h/o LEEP/Cryo  No LMP recorded (lmp unknown). Patient is postmenopausal.   V0 (C/S x1)  Obstetric complications? no  Vaginal births? no  -use of forceps or vacuum? no    OB History    Para Term  AB Living   1 1 1 0 0 1   SAB IAB Ectopic Multiple Live Births   0 0 0 0 1     Review of patient's allergies indicates:   Allergen Reactions    Alendronate Diarrhea    Cortisone      Other reaction(s): breaks out        Current Outpatient Medications:     aspirin (ECOTRIN) 81 MG EC tablet, Take 1 tablet by mouth once daily., Disp: , Rfl:     levothyroxine (SYNTHROID) 125 MCG tablet, Take 1 tablet (125 mcg total) by mouth before breakfast., Disp: 30 tablet, Rfl: 1    losartan (COZAAR) 50 MG tablet, Take 1 tablet (50 mg total) by mouth once daily., Disp: 30 tablet, Rfl: 0    MULTIVITAMIN ORAL, Take by mouth., Disp: , Rfl:     estradioL (ESTRACE) 0.01 % (0.1 mg/gram) vaginal cream, Use a pea sized amount to the vagina once a night for 14 nights when initiating the medication, then 2-3 times per week.  DO NOT use the applicator., Disp: 42.5 g, Rfl: 10    Past Medical History:   Diagnosis Date    Depression     History of right breast cancer     Hypertension     Hypothyroid     Stroke     Urinary tract infection      Past Surgical History:   Procedure Laterality Date    breast      BREAST BIOPSY      4 excisional biopsies     " SECTION  1968    CHOLECYSTECTOMY      MASTECTOMY Right        Family History   Problem Relation Age of Onset    No Known Problems Father     No Known Problems Mother     Cancer Paternal Grandmother         throat cancer    Cancer Paternal Grandfather         prostate cancer    No Known Problems Sister     No Known Problems Brother     Breast cancer Neg Hx     Colon cancer Neg Hx     Ovarian cancer Neg Hx         Social History     Tobacco Use    Smoking status: Every Day     Packs/day: 0.50     Types: Cigarettes    Smokeless tobacco: Never   Substance Use Topics    Alcohol use: No    Drug use: Yes     Types: Benzodiazepines     Comment: Prescribed        Review of Systems   Psychological ROS: negative  Eyes: Negative  ENT ROS: Negative  Neuro ROS: Sudden mental changes, Numbness or weakness in arms or legs, and Unsteady gait  Respiratory ROS: Painful breathing  Gastrointestinal ROS: Nausea or vomiting and Loss of appetite  Cardiovascular ROS: Heart attack and Difficulty breathing at night  Genitourinary ROS: Negative  Integumentary ROS: Negative  Musculoskeletal ROS: Negative      Physical Exam:   BP (!) 165/87 (BP Location: Left arm, Patient Position: Sitting, BP Method: Medium (Automatic))   Pulse 82   Resp 18   Ht 4' 11" (1.499 m)   Wt 54 kg (119 lb 0.8 oz)   LMP  (LMP Unknown)   BMI 24.04 kg/m²   General: No acute distress   Skin: no lesions  Musculoskeletal: normal lower extremity strength  Sensation to light touch in the lower extremities is normal   Extremities: well perfused with normal pulses in the distal extremities, no peripheral edema noted  Abdominal exam: soft non tender, no palpable masses, no scar  External genitalia: normal female genitalia, no lesions, normal female hair distribution, no clitoral enlargement, nontender, no scars  The perineal reflexes are present  Exam Chaperoned by: Tamra Norris LPN  Vagina/cervix: atrophic vagina, no discharge, exudate, lesion, or " erythema.  Small introitus 1cm.  Bilateral labia minora almost completely resorbed, very small near the clitoris  Bimanual exam: Anteverted, Small, and Mobile, no palpable masses, non-tender exam  Urethra: no prolapse, caruncle, absent  Stress test: negative  Post void residual volume: 6 mL    POPQ (Date 2023): No significant prolapse noted]    ASSESSMENT:  Leigh Townsend is a 77 y.o.    1. Urinary urgency    2. Pelvic pressure in female    3. Vaginal atrophy          PLAN:  The pathophysiology of the above condition has been discussed with the patient who expressed understanding.   We discussed the differences between stress and urge incontinence.    Urinary urgency/pelvic pressure- patient believed she had prolapse based on her suprapubic pressure and urinary urgency.  Her voiding patterns are unclear at this point.  Will send with Bladder diary and Bladder irritating foods handout.  Patient has no signs of prolapse.    Vaginal atrophy - Encouraged use of vaginal estrogen as it will restore some elasticity to the vagina and has been shown to decrease the rate of recurrent UTIs, decrease vaginal pH, and increase vaginal lactobacillus.  A pea-sized amount to the vagina every night for 14 days, then 2-3 times per week.  Handout given on low dose vaginal estrogen.    Smoker -  smoking is a known bladder irritant and risk factor for bladder cancer.  She states in the past she had quit smoking, but resumed in the past 5 years.  Counseled cessation of smoking is in the best interest of her general health and in the health of her bladder in particular.  She has urgency, frequency, and suprapubic pressure in a long term smoker.  Will schedule cystoscopy.    We explained the possible need for urodynamic testing to better clarify the nature of her voiding complaints.      At the time the patient desires to proceed:  Bladder diary  Foods handout   Vaginal estrogen  Cystoscopy (long term smoker with  urgency/frequency)    Handouts provided on above diagnosis    Follow up after completion of bladder diary/cystoscopy    Orders Placed This Encounter    Urinalysis, Reflex to Urine Culture    Urinalysis, Microscopic    estradioL (ESTRACE) 0.01 % (0.1 mg/gram) vaginal cream         ESTRELLA Beavers MD  Pelvic Medicine and Reconstructive Surgery  Urogynecology  [facility]    80 minutes was spent face to face with patient >50% of the time was spent in counseling and coordination of care.

## 2023-04-06 ENCOUNTER — TELEPHONE (OUTPATIENT)
Dept: UROGYNECOLOGY | Facility: CLINIC | Age: 78
End: 2023-04-06
Payer: MEDICARE

## 2023-04-06 NOTE — TELEPHONE ENCOUNTER
Patient had questions concerning the over the counter medication Dr. Beavers recommended. Spoke with Dr. Beavers and attempted to call patient back with information. Unable to reach. Left voicemail with call back information.

## 2023-05-18 ENCOUNTER — OFFICE VISIT (OUTPATIENT)
Dept: UROGYNECOLOGY | Facility: CLINIC | Age: 78
End: 2023-05-18
Payer: MEDICARE

## 2023-05-18 ENCOUNTER — TELEPHONE (OUTPATIENT)
Dept: UROGYNECOLOGY | Facility: CLINIC | Age: 78
End: 2023-05-18

## 2023-05-18 DIAGNOSIS — F17.200 CURRENT SMOKER: ICD-10-CM

## 2023-05-18 DIAGNOSIS — N95.2 VAGINAL ATROPHY: ICD-10-CM

## 2023-05-18 DIAGNOSIS — R10.2 PELVIC PRESSURE IN FEMALE: ICD-10-CM

## 2023-05-18 DIAGNOSIS — R39.15 URINARY URGENCY: Primary | ICD-10-CM

## 2023-05-18 PROCEDURE — 99215 OFFICE O/P EST HI 40 MIN: CPT | Mod: 25,S$GLB,, | Performed by: OBSTETRICS & GYNECOLOGY

## 2023-05-18 PROCEDURE — 99215 PR OFFICE/OUTPT VISIT, EST, LEVL V, 40-54 MIN: ICD-10-PCS | Mod: 25,S$GLB,, | Performed by: OBSTETRICS & GYNECOLOGY

## 2023-05-18 PROCEDURE — 52000 CYSTOSCOPY: ICD-10-PCS | Mod: S$GLB,,, | Performed by: OBSTETRICS & GYNECOLOGY

## 2023-05-18 PROCEDURE — 52000 CYSTOURETHROSCOPY: CPT | Mod: S$GLB,,, | Performed by: OBSTETRICS & GYNECOLOGY

## 2023-05-18 RX ORDER — NITROFURANTOIN 25; 75 MG/1; MG/1
100 CAPSULE ORAL 2 TIMES DAILY
Qty: 3 CAPSULE | Refills: 0 | Status: SHIPPED | OUTPATIENT
Start: 2023-05-18 | End: 2023-05-20

## 2023-05-18 RX ORDER — LIDOCAINE HYDROCHLORIDE 20 MG/ML
JELLY TOPICAL
Status: COMPLETED | OUTPATIENT
Start: 2023-05-18 | End: 2023-05-18

## 2023-05-18 RX ADMIN — LIDOCAINE HYDROCHLORIDE: 20 JELLY TOPICAL at 03:05

## 2023-05-18 NOTE — TELEPHONE ENCOUNTER
Left patient voicemail explaining that Dr. Beavers Prescribed her Macrobid prophylactic to avoid any infections after her cystoscopy. I let her know that if she has any questions to please call the office and left our call back information.

## 2023-05-18 NOTE — PROGRESS NOTES
PELVIC MEDICINE AND RECONSTRUCTIVE SURGERY FOLLOW UP NOTE    CHIEF COMPLAINT:  Follow up urinary frequency, pelvic pressure, cystoscopy    HISTORY OF PRESENT ILLNESS:   Leigh Townsend is a 77 y.o. female  Patient reports first noticing symptoms .      Patient states she feels a lot of pelvic pressure and urinary urgency/frequency.    Patient denies urine leakage.    Patient believes that she has pelvic organ prolapse because of the suprapubic pressure that she is experiencing.  Patient states she looked it up on [employer] and she read that most likely the cause of her pelvic pressure with prolapse.  She presents today for physical exam to confirm prolapse and to find the etiology of her pelvic pressure.  She also states ever since she had her gallbladder taken out, she has diarrhea every day.  She takes Imodium to control her symptoms, however She states this is not adequate and she still has multiple porridge it is like bowel movements daily.  She states she is not been seen by GI yet.    Voids during the day: q1-2h  Voids at nighttime: unclear, states she doesn't sleep  Leakage of urine with coughing or exercise: Yes   -previous surgery? No  Leakage of urine with urgency: Yes - wears depends overnight pads.   Pad for leakage of urine:yes   -how often do you change your pad?  Damp pads.  Changes as needed  Sensation of incomplete emptying: Yes  Splinting to void/BM: No  History of kidney stones No  Hematuria No  > 3 UTIs in 12 months  No  - symptoms with UTI? N/A    Glasses/ounces of water in 1 day: 1 glass  Cups of coffee/tea/soda in 1 day: 3 small sodas  Bowel movements in 1 week: 30  Constipation/straining: yes  Diarrhea: yes - since her GB surgery.  On Immodium  Fecal incontinence: No  Fecal urgency: Yes  Fecal smearing: No    Interim Hx:  Patient states she is doing better.  She no longer has the urgency and frequency.  She no longer has the pelvic pressure.  She is sleeping much better now. She has been using the  vaginal estrogen and is doing well.  Patient presents today for cystoscopy.      Gyn Hx:  Patient reports h/o Normal paps; denies h/o Fibroids, denies h/o Endometriosis, denies h/o Ovarian Cysts, denies h/o abnormal paps, denies h/o LEEP/Cryo  No LMP recorded (lmp unknown). Patient is postmenopausal.   V0 (C/S x1)  Obstetric complications? no  Vaginal births? no  -use of forceps or vacuum? no    OB History    Para Term  AB Living   1 1 1 0 0 1   SAB IAB Ectopic Multiple Live Births   0 0 0 0 1     Review of patient's allergies indicates:   Allergen Reactions    Alendronate Diarrhea    Cortisone      Other reaction(s): breaks out        Current Outpatient Medications:     ARIPiprazole (ABILIFY) 10 MG Tab, Take 1 tablet (10 mg total) by mouth once daily., Disp: 30 tablet, Rfl: 1    aspirin (ECOTRIN) 81 MG EC tablet, Take 1 tablet by mouth once daily., Disp: , Rfl:     estradioL (ESTRACE) 0.01 % (0.1 mg/gram) vaginal cream, Use a pea sized amount to the vagina once a night for 14 nights when initiating the medication, then 2-3 times per week.  DO NOT use the applicator., Disp: 42.5 g, Rfl: 10    levothyroxine (SYNTHROID) 125 MCG tablet, Take 1 tablet (125 mcg total) by mouth before breakfast., Disp: 30 tablet, Rfl: 1    losartan (COZAAR) 50 MG tablet, Take 1 tablet (50 mg total) by mouth once daily., Disp: 30 tablet, Rfl: 0    MULTIVITAMIN ORAL, Take by mouth., Disp: , Rfl:     multivitamin with minerals tablet, Take 1 tablet by mouth once daily., Disp: , Rfl:     nitrofurantoin, macrocrystal-monohydrate, (MACROBID) 100 MG capsule, Take 1 capsule (100 mg total) by mouth 2 (two) times daily. for 3 doses, Disp: 3 capsule, Rfl: 0  No current facility-administered medications for this visit.    Past Medical History:   Diagnosis Date    Depression     History of right breast cancer     Hypertension     Hypothyroid     Stroke     Urinary tract infection      Past Surgical History:   Procedure Laterality  Date    breast      BREAST BIOPSY      4 excisional biopsies     SECTION  1968    CHOLECYSTECTOMY      MASTECTOMY Right        Family History   Problem Relation Age of Onset    No Known Problems Father     No Known Problems Mother     Cancer Paternal Grandmother         throat cancer    Cancer Paternal Grandfather         prostate cancer    No Known Problems Sister     No Known Problems Brother     Breast cancer Neg Hx     Colon cancer Neg Hx     Ovarian cancer Neg Hx         Social History     Tobacco Use    Smoking status: Every Day     Packs/day: 0.50     Types: Cigarettes    Smokeless tobacco: Never   Substance Use Topics    Alcohol use: No    Drug use: Yes     Types: Benzodiazepines     Comment: Prescribed        Review of Systems   Psychological ROS: negative  Eyes: Negative  ENT ROS: Negative  Neuro ROS: Sudden mental changes, Numbness or weakness in arms or legs, and Unsteady gait  Respiratory ROS: Painful breathing  Gastrointestinal ROS: Nausea or vomiting and Loss of appetite  Cardiovascular ROS: Heart attack and Difficulty breathing at night  Genitourinary ROS: Negative  Integumentary ROS: Negative  Musculoskeletal ROS: Negative      Physical Exam:   LMP  (LMP Unknown)   General: No acute distress   Skin: no lesions  Musculoskeletal: normal lower extremity strength  Sensation to light touch in the lower extremities is normal   Extremities: well perfused with normal pulses in the distal extremities, no peripheral edema noted  Abdominal exam: soft non tender, no palpable masses, no scar  External genitalia: normal female genitalia, no lesions, normal female hair distribution, no clitoral enlargement, nontender, no scars  The perineal reflexes are present  Exam Chaperoned by: Tamra Norris LPN  Vagina/cervix: atrophic vagina, no discharge, exudate, lesion, or erythema.  Small introitus 1cm.  Bilateral labia minora almost completely resorbed, very small near the clitoris  Bimanual exam:  Anteverted, Small, and Mobile, no palpable masses, non-tender exam  Urethra: no prolapse, caruncle, absent  Stress test: negative  Post void residual volume: 6 mL    POPQ (Date 2023): No significant prolapse noted]    ASSESSMENT:  Leigh Townsend is a 77 y.o.    1. Urinary urgency    2. Pelvic pressure in female    3. Vaginal atrophy    4. Current smoker          PLAN:  The pathophysiology of the above condition has been discussed with the patient who expressed understanding.   We discussed the differences between stress and urge incontinence.    Urinary urgency/pelvic pressure- patient believed she had prolapse based on her suprapubic pressure and urinary urgency.  Her voiding patterns are unclear at this point.  Will send with Bladder diary and Bladder irritating foods handout.  Patient has no signs of prolapse.    -Resolved. Patient states she has been doing well and no longer has these symptoms.  She has been using the vaginal estrogen.  Since her symptoms have resolved and her cystoscopy was normal, she can follow up in 6 months.    Vaginal atrophy - Encouraged use of vaginal estrogen as it will restore some elasticity to the vagina and has been shown to decrease the rate of recurrent UTIs, decrease vaginal pH, and increase vaginal lactobacillus.  A pea-sized amount to the vagina every night for 14 days, then 2-3 times per week.  Handout given on low dose vaginal estrogen.    -Continue use of vaginal estrogen.  The health of the vaginal tissue has improved vastly and this is most likely the reason for resolution of her symptoms.    Smoker -  smoking is a known bladder irritant and risk factor for bladder cancer.  She states in the past she had quit smoking, but resumed in the past 5 years.  Counseled cessation of smoking is in the best interest of her general health and in the health of her bladder in particular.  She has urgency, frequency, and suprapubic pressure in a long term smoker.      -  Encouraged her progress.  She should continue to cut down on smoking.  She states she only smokes two packs a week now and she used to smoke a carton a week.    We explained the possible need for urodynamic testing to better clarify the nature of her voiding complaints.      At the time the patient desires to proceed:  Bladder diary - she states she completed it but left it at home  Vaginal estrogen - continue use  Cystoscopy (long term smoker with urgency/frequency) - normal exam    Handouts provided on above diagnosis    Follow up 6 months    Orders Placed This Encounter    Cystoscopy    LIDOcaine HCl 2% urojet    nitrofurantoin, macrocrystal-monohydrate, (MACROBID) 100 MG capsule       ESTRELLA Beavers MD  Pelvic Medicine and Reconstructive Surgery  Urogynecology  [facility]    60 minutes was spent face to face with patient >50% of the time was spent in counseling and coordination of care.

## 2023-05-18 NOTE — PROCEDURES
Cystoscopy     Brief Procedure Note  Name:  Leigh Townsend  MRN: [id number]  Age: 77 y.o.   YOB: 1945  Gender: female      Procedure Date:  05/18/2023      Location:  Clinic     Pre Procedure Diagnosis:  1. Urinary urgency    2. Pelvic pressure in female    3. Vaginal atrophy         Post-Op Diagnosis:  same    Procedure:  Flexible cystoscopy    Surgeon:   Tess Beavers MD    Anesthesia Type:  2% Lidocaine jelly (Urojet)    Findings:  Ureteral orifices in normal orthotopic position, no trabeculations, no tumors, no mucosal lesions, no diverticula seen.  Normal urethra and bladder.    Estimated Blood Loss:  0 ml    Fluids:  Normal saline    Complications: None    Condition:  stable    Disposition:  home    Procedure in Detail::  Patient was placed on table in dorsal lithotomy position and prepped and draped in normal sterile fashion.  A well lubricated 16 Romanian flexible cystoscope was inserted into urethral meatus and advanced into the bladder.  Care was taken to keep the lumen in the center of view.  In the urethra there were no strictures, mucosal lesions, tumors, or polyps.  The bladder was then partially filled and evaluated in a pandescopic fashion. The bilateral ureteral orifices were in normal orthotopic position. There were no mucosal lesions, trabeculations, diverticula, stones, or tumors. Overall the bladder appeared normal.   Retroflexed view showed normal urothelium at the bladder neck.  The bladder was then drained and the results briefly discussed with patient.  All parts of the cystoscopic sheath and instruments were intact removed from the patient.  Patient tolerated procedure well.  All instrument, and sponge counts were correct.  Patient was allowed to recover in the room.    ESTRELLA Beavers MD  Pelvic Medicine and Reconstructive Surgery  Urogynecology    Completed by:  Tess Beavers, May 18, 2023, 2:05 PM     Cystoscopy    Date/Time: 5/18/2023 2:20 PM  Performed by: Tess Beavers,  MD  Authorized by: Tess Beavers MD     Consent Done?:  Yes (Verbal)  Timeout: prior to procedure the correct patient, procedure, and site was verified    Prep: patient was prepped and draped in usual sterile fashion    Local anesthesia used?: Yes    Anesthesia:  Intraurethral instillation  Local anesthetic:  Topical anesthetic  Anesthetic total (ml):  5  Indications comment:  Urinary frequency/bladder pressure/smoker  Position:  Dorsal lithotomy  Anesthesia:  Intraurethral instillation  Patient sedated?: No    Preparation: Patient was prepped and draped in usual sterile fashion    Scope type:  Flexible cystoscope  External exam normal: Yes    Urethra normal: Yes    Bladder neck normal: Yes    Bladder normal: Yes     patient tolerated the procedure well with no immediate complications

## 2023-09-05 DIAGNOSIS — Z12.31 OTHER SCREENING MAMMOGRAM: Primary | ICD-10-CM

## 2023-09-15 ENCOUNTER — HOSPITAL ENCOUNTER (OUTPATIENT)
Dept: RADIOLOGY | Facility: HOSPITAL | Age: 78
Discharge: HOME | End: 2023-09-15
Attending: STUDENT IN AN ORGANIZED HEALTH CARE EDUCATION/TRAINING PROGRAM
Payer: MEDICARE

## 2023-09-15 DIAGNOSIS — Z12.31 OTHER SCREENING MAMMOGRAM: ICD-10-CM

## 2023-09-15 PROCEDURE — 77063 MAMMO DIGITAL SCREENING LEFT WITH TOMO: ICD-10-PCS | Mod: 26,52,, | Performed by: RADIOLOGY

## 2023-09-15 PROCEDURE — 77067 SCR MAMMO BI INCL CAD: CPT | Mod: TC,52

## 2023-09-15 PROCEDURE — 77067 SCR MAMMO BI INCL CAD: CPT | Mod: 26,52,, | Performed by: RADIOLOGY

## 2023-09-15 PROCEDURE — 77063 BREAST TOMOSYNTHESIS BI: CPT | Mod: 26,52,, | Performed by: RADIOLOGY

## 2023-09-15 PROCEDURE — 77067 MAMMO DIGITAL SCREENING LEFT WITH TOMO: ICD-10-PCS | Mod: 26,52,, | Performed by: RADIOLOGY

## 2023-09-18 NOTE — PROGRESS NOTES
Results reviewed. Mammogram negative. Repeat in 12 months from prior study. Please be sure patient has received letter from radiologist reviewing results as well.

## 2023-11-14 PROBLEM — F13.20 BENZODIAZEPINE DEPENDENCE: Status: ACTIVE | Noted: 2023-11-14

## 2023-11-14 PROBLEM — G11.9 CEREBELLAR ATAXIA: Status: ACTIVE | Noted: 2023-11-14

## 2023-11-14 PROBLEM — G81.90: Status: ACTIVE | Noted: 2023-11-14

## 2023-11-14 PROBLEM — G40.909 NONINTRACTABLE EPILEPSY WITHOUT STATUS EPILEPTICUS, UNSPECIFIED EPILEPSY TYPE: Status: ACTIVE | Noted: 2023-11-14

## 2023-11-17 NOTE — PRE-PROCEDURE INSTRUCTIONS
Preop instructions: NPO after midnight or 8 hours prior to procedure time, shower instructions, directions, leave all valuables at home, medication instructions for PM prior & am of procedure explained. Patient stated an understanding.  Patient denies any side effects or issues with anesthesia or sedation.  Patient does not know arrival time. Explained that this information comes from the surgeons office and if they have not heard from them by 3pm to call office. Patient stated an understanding.  
No

## 2024-01-30 ENCOUNTER — OFFICE VISIT (OUTPATIENT)
Dept: NEUROLOGY | Facility: CLINIC | Age: 79
End: 2024-01-30
Payer: MEDICARE

## 2024-01-30 VITALS
SYSTOLIC BLOOD PRESSURE: 140 MMHG | DIASTOLIC BLOOD PRESSURE: 84 MMHG | WEIGHT: 125 LBS | HEIGHT: 59 IN | BODY MASS INDEX: 25.2 KG/M2

## 2024-01-30 DIAGNOSIS — G89.29 CHRONIC RIGHT-SIDED LOW BACK PAIN WITHOUT SCIATICA: Chronic | ICD-10-CM

## 2024-01-30 DIAGNOSIS — F41.9 ANXIETY: Chronic | ICD-10-CM

## 2024-01-30 DIAGNOSIS — M54.50 CHRONIC RIGHT-SIDED LOW BACK PAIN WITHOUT SCIATICA: Chronic | ICD-10-CM

## 2024-01-30 DIAGNOSIS — R20.2 FACIAL PARESTHESIA: Primary | ICD-10-CM

## 2024-01-30 PROBLEM — M54.9 CHRONIC RIGHT-SIDED BACK PAIN: Chronic | Status: ACTIVE | Noted: 2024-01-30

## 2024-01-30 PROCEDURE — 3077F SYST BP >= 140 MM HG: CPT | Mod: CPTII,S$GLB,, | Performed by: SPECIALIST

## 2024-01-30 PROCEDURE — 99999 PR PBB SHADOW E&M-EST. PATIENT-LVL III: CPT | Mod: PBBFAC,,, | Performed by: SPECIALIST

## 2024-01-30 PROCEDURE — 3079F DIAST BP 80-89 MM HG: CPT | Mod: CPTII,S$GLB,, | Performed by: SPECIALIST

## 2024-01-30 PROCEDURE — 1159F MED LIST DOCD IN RCRD: CPT | Mod: CPTII,S$GLB,, | Performed by: SPECIALIST

## 2024-01-30 PROCEDURE — 99204 OFFICE O/P NEW MOD 45 MIN: CPT | Mod: S$GLB,,, | Performed by: SPECIALIST

## 2024-01-30 NOTE — PROGRESS NOTES
"Subjective:      @Patient ID: Leigh Townsend is a 78 y.o. female.    Chief Complaint: NP ref by Dr Madrigal for neuro cons to [facility] for Neuropathy (of scalp/head/face/neck)  HPI:            C/U a prickly pain in her face and head.   States she scratches her face a lot due to itching.   C/O rt lower face numb. But at least once she mentioned her L eye was numb     C/O pain in her lower back for 2 yrs. She initially described to LPN then again after MD and she discussed her face numbness     She describes her life in the last two years as the 'twilight zone'     Took gabapentin  at escalating doses without relief     Multiple times during encounter she discussed her prior use of clonazepam     Her dtr is Kezia and son in law Gill Curran     Notes may also be on facesheet for HPI, ROS, and other sections   Review of Systems       Social History     Tobacco Use    Smoking status: Every Day     Current packs/day: 0.50     Types: Cigarettes    Smokeless tobacco: Never   Substance Use Topics    Alcohol use: No    Drug use: Yes     Types: Benzodiazepines     Comment: Prescribed      [] Single     []     [] [x]   [] Working     [] Retired, worked as:   [] Drives     [x] Does not drive   ----------------------------  Depression  History of right breast cancer  History of skin cancer in adulthood  Hypertension  Hypothyroid  Stroke  Urinary tract infection      Current Outpatient Medications   Medication Instructions    aspirin (ECOTRIN) 81 MG EC tablet 1 tablet, Oral, Daily    levothyroxine (SYNTHROID) 150 mcg, Oral, Before breakfast    losartan (COZAAR) 50 mg, Oral, Daily    MULTIVITAMIN ORAL Oral    multivitamin with minerals tablet 1 tablet, Oral, Daily    traZODone (DESYREL) 100 mg, Oral, Nightly PRN      There are no discontinued medications.  Objective:      Exam:   Visit Vitals  BP (!) 140/84   Ht 4' 11" (1.499 m)   Wt 56.7 kg (125 lb)   LMP  (LMP Unknown)   BMI 25.25 kg/m²     General Exam  [x] " Unaccompanied   [] Accompanied, by__ []Spouse     []Child            []_____________       body habitus_ Body mass index is 25.25 kg/m².  []Gen exam overall unremarkable    []Abnormalities, if present, checked   []Mental Status_alert and appropriate    []Oropharynx_Mallampati grade_1 or 2  [] OP   M3    [] M4  []Neck_ no bruits     [] Bruit   [x]Heart__ RRR s murmurs or extra beats   [] Irreg  []murmur  []Extremities_ no edema or lesions   [] Extr edema     Neurological:  []Normal neuro exam        []Cortical function seems normal  Abnormalities, if present, checked     [x]Speech __ normal    []    Cranial nerves:    []  []CN 2 VF_ok     []  []Fundi_ normal     []  [x]CN 3, 4, 6 EOMs_ok   []  []CN 3, pupils_ok   []  []CN 7_no lower face asymmetry  [x] L lower face weakness or decr L nasolabial fold   eyes symmetric   [x]CN 8_hearing _ ok   []  [x]CN 12 tongue_ok   []    [x]Motor__ normal all groups   []  [x]Tone: normal     []  []Reflexes__ normal or unremarkable  [x]  Absent   [x]Vib Sens_ normal in extr's incl toes  []  []Pin Sens_    []  [x]Plantars__ flat     []  [x]Tremor: _ none    []  [x]Coordination: _ F to N normal  []  [x]Gait_      []Cane  []Wheelchair  []_______  []Romberg: negative    []Romberg positive     []MMSE; if done:         No data to display                 Neuroimaging:  [] Images and imaging reports reviewed.  Rads summary:  My comments:     Labs:    [x]  New Patient         []  Multiple Issues/ diagnoses or problems  [if not enumerated in note then discussed but not documented]    Complexity of Data:   [] High    [x] Moderate   [] Images and reports reviewed  [] Other studies reviewed   [] History obtained from accompaniment [] Differential Diagnoses discussed   [x] Studies considered/ discussed but not ordered [] Studies ordered     Risks:   [] High     [] Moderate   [] (poss or definite) neurodegenerative condition [] () autoimmune condition with possibility of flares or unexpected  attack  [] () seiz d.o. with possib of recurr seiz's  [] Cerebrovasc ds with risk of recurrent stroke  [] CNS meds (and/or) potentially high risk non CNS meds taken or discussed which may cause med or behav SE's  [] Fall risk [] Driving discussed  [] Diagnosis unclear or DDx wide making risk uncertain   []:    MDM:    [] High     [x] Moderate       Assessment/Plan:         ICD-10-CM ICD-9-CM   1. Facial paresthesia  R20.2 782.0   2. Anxiety  F41.9 300.00   3. Chronic right-sided low back pain without sciatica  M54.50 724.2    G89.29 338.29   Apologies that I could not be more helpful to her today   Her primary or other provider(s) could try her on the doxepin as prev recommended by my neurologist colleague  Duloxetine could also be tried   However I am unwilling to initiate any Rx's with her as we will not be following her longitudinally       Other Comments / Follow Up:           MD BRYAN HuberA FAAN, [facility]  [facility] Medical Director   [facility]

## 2024-05-14 ENCOUNTER — OFFICE VISIT (OUTPATIENT)
Dept: PRIMARY CARE CLINIC | Facility: CLINIC | Age: 79
End: 2024-05-14
Payer: MEDICARE

## 2024-05-14 VITALS
BODY MASS INDEX: 25.12 KG/M2 | HEART RATE: 70 BPM | DIASTOLIC BLOOD PRESSURE: 84 MMHG | SYSTOLIC BLOOD PRESSURE: 194 MMHG | WEIGHT: 124.63 LBS | RESPIRATION RATE: 18 BRPM | HEIGHT: 59 IN | OXYGEN SATURATION: 98 %

## 2024-05-14 DIAGNOSIS — F51.01 PRIMARY INSOMNIA: ICD-10-CM

## 2024-05-14 DIAGNOSIS — M54.2 NECK PAIN ON LEFT SIDE: ICD-10-CM

## 2024-05-14 DIAGNOSIS — N62 GYNECOMASTIA, FEMALE: ICD-10-CM

## 2024-05-14 DIAGNOSIS — I10 PRIMARY HYPERTENSION: ICD-10-CM

## 2024-05-14 DIAGNOSIS — E03.9 HYPOTHYROIDISM, UNSPECIFIED TYPE: ICD-10-CM

## 2024-05-14 DIAGNOSIS — G11.9 CEREBELLAR ATAXIA: ICD-10-CM

## 2024-05-14 DIAGNOSIS — C50.811 MALIGNANT NEOPLASM OF OVERLAPPING SITES OF RIGHT BREAST IN FEMALE, ESTROGEN RECEPTOR NEGATIVE: ICD-10-CM

## 2024-05-14 DIAGNOSIS — G81.90 FACIAL HEMIPARESIS: ICD-10-CM

## 2024-05-14 DIAGNOSIS — R20.2 FACIAL PARESTHESIA: Primary | ICD-10-CM

## 2024-05-14 DIAGNOSIS — M54.9 UPPER BACK PAIN ON LEFT SIDE: ICD-10-CM

## 2024-05-14 DIAGNOSIS — Z17.1 MALIGNANT NEOPLASM OF OVERLAPPING SITES OF RIGHT BREAST IN FEMALE, ESTROGEN RECEPTOR NEGATIVE: ICD-10-CM

## 2024-05-14 PROBLEM — F13.20 BENZODIAZEPINE DEPENDENCE: Status: RESOLVED | Noted: 2023-11-14 | Resolved: 2024-05-14

## 2024-05-14 PROBLEM — G89.29 CHRONIC LEFT-SIDED THORACIC BACK PAIN: Status: ACTIVE | Noted: 2024-01-30

## 2024-05-14 PROBLEM — G40.909 NONINTRACTABLE EPILEPSY WITHOUT STATUS EPILEPTICUS, UNSPECIFIED EPILEPSY TYPE: Status: RESOLVED | Noted: 2023-11-14 | Resolved: 2024-05-14

## 2024-05-14 PROBLEM — M54.6 CHRONIC LEFT-SIDED THORACIC BACK PAIN: Status: ACTIVE | Noted: 2024-01-30

## 2024-05-14 PROCEDURE — 1159F MED LIST DOCD IN RCRD: CPT | Mod: CPTII,,, | Performed by: STUDENT IN AN ORGANIZED HEALTH CARE EDUCATION/TRAINING PROGRAM

## 2024-05-14 PROCEDURE — 3077F SYST BP >= 140 MM HG: CPT | Mod: CPTII,,, | Performed by: STUDENT IN AN ORGANIZED HEALTH CARE EDUCATION/TRAINING PROGRAM

## 2024-05-14 PROCEDURE — 1160F RVW MEDS BY RX/DR IN RCRD: CPT | Mod: CPTII,,, | Performed by: STUDENT IN AN ORGANIZED HEALTH CARE EDUCATION/TRAINING PROGRAM

## 2024-05-14 PROCEDURE — 3079F DIAST BP 80-89 MM HG: CPT | Mod: CPTII,,, | Performed by: STUDENT IN AN ORGANIZED HEALTH CARE EDUCATION/TRAINING PROGRAM

## 2024-05-14 PROCEDURE — 99215 OFFICE O/P EST HI 40 MIN: CPT | Mod: ,,, | Performed by: STUDENT IN AN ORGANIZED HEALTH CARE EDUCATION/TRAINING PROGRAM

## 2024-05-14 RX ORDER — LEVOTHYROXINE SODIUM 150 UG/1
150 TABLET ORAL
Qty: 90 TABLET | Refills: 3 | Status: SHIPPED | OUTPATIENT
Start: 2024-05-14 | End: 2025-05-09

## 2024-05-14 RX ORDER — DULOXETIN HYDROCHLORIDE 30 MG/1
30 CAPSULE, DELAYED RELEASE ORAL DAILY
Qty: 30 CAPSULE | Refills: 0 | Status: SHIPPED | OUTPATIENT
Start: 2024-05-14 | End: 2024-06-13 | Stop reason: SDUPTHER

## 2024-05-14 RX ORDER — LOSARTAN POTASSIUM 50 MG/1
50 TABLET ORAL DAILY
Qty: 90 TABLET | Refills: 3 | Status: SHIPPED | OUTPATIENT
Start: 2024-05-14

## 2024-05-14 RX ORDER — TRAZODONE HYDROCHLORIDE 100 MG/1
100 TABLET ORAL NIGHTLY PRN
Qty: 90 TABLET | Refills: 3 | Status: SHIPPED | OUTPATIENT
Start: 2024-05-14

## 2024-05-14 NOTE — PROGRESS NOTES
Chief Complaint  No chief complaint on file.      TRISTIAN Townsend is a 78 y.o. female with medical diagnoses as listed in the medical history and problem list that presents to clinic with her daughter to establish care.   Histories reviewed and patient needs refills for all her medications. She has ran out of med for at least 2 weeks in between finding new PCP.     Acute complaints:   - Chronic facial numbness and tingling sensation for about 2 years now:  she was recommended to take Duloxetine by neurologist after failing gabapentin but never received any prescription. Denies difficulty speaking or swallowing.  - Left back pain gradually getting worse over the last couple year. Patient has had right side mastectomy 2/2 cancer and since then her posture has changed causing straining of left side of back and neck muscle. Patient inquires about breast surgeon referral for possible left mastectomy.     Health Maintenance         Date Due Completion Date    Hepatitis C Screening Never done ---    DEXA Scan Never done ---    RSV Vaccine (Age 60+ and Pregnant patients) (1 - 1-dose 60+ series) Never done ---    TETANUS VACCINE 11/14/2024 (Originally 6/17/2024) 6/17/2014    Shingles Vaccine (1 of 2) 11/14/2024 (Originally 9/12/1964) ---    COVID-19 Vaccine (3 - Moderna risk series) 11/14/2024 (Originally 4/6/2021) 3/9/2021    Mammogram 09/15/2024 9/15/2023    Lipid Panel 11/14/2028 11/14/2023    Override on 3/9/2020: Done    Colonoscopy 11/01/2033 11/1/2023    Override on 10/8/2019: Done            ALLERGIES AND MEDICATIONS: updated and reviewed.  Review of patient's allergies indicates:   Allergen Reactions    Alendronate Diarrhea     Current Outpatient Medications   Medication Sig Dispense Refill    aspirin (ECOTRIN) 81 MG EC tablet Take 1 tablet by mouth once daily.      multivitamin with minerals tablet Take 1 tablet by mouth once daily.      DULoxetine (CYMBALTA) 30 MG capsule Take 1 capsule (30 mg total) by mouth  "once daily. 30 capsule 0    levothyroxine (SYNTHROID) 150 MCG tablet Take 1 tablet (150 mcg total) by mouth before breakfast. 90 tablet 3    losartan (COZAAR) 50 MG tablet Take 1 tablet (50 mg total) by mouth once daily. 90 tablet 3    traZODone (DESYREL) 100 MG tablet Take 1 tablet (100 mg total) by mouth nightly as needed for Insomnia. 90 tablet 3     No current facility-administered medications for this visit.       Histories are reviewed and updated as appropriate     Review of Systems  Comprehensive review of system performed- negative except noted in HPI       Objective:   Vitals:    05/14/24 1400   BP: (!) 194/84   BP Location: Left arm   Patient Position: Sitting   Pulse: 70   Resp: 18   SpO2: 98%   Weight: 56.5 kg (124 lb 9.6 oz)   Height: 4' 11" (1.499 m)    Body mass index is 25.17 kg/m².  Physical Exam  Vitals and nursing note reviewed.   Constitutional:       General: She is not in acute distress.     Appearance: Normal appearance.   HENT:      Head: Normocephalic and atraumatic.      Mouth/Throat:      Mouth: Mucous membranes are moist.      Pharynx: Oropharynx is clear.   Eyes:      Extraocular Movements: Extraocular movements intact.      Conjunctiva/sclera: Conjunctivae normal.   Cardiovascular:      Rate and Rhythm: Normal rate and regular rhythm.   Pulmonary:      Effort: Pulmonary effort is normal.      Breath sounds: Normal breath sounds.   Abdominal:      Palpations: Abdomen is soft.   Musculoskeletal:         General: No swelling or deformity. Normal range of motion.      Cervical back: Normal range of motion.   Skin:     General: Skin is warm and dry.   Neurological:      General: No focal deficit present.      Mental Status: She is alert and oriented to person, place, and time. Mental status is at baseline.   Psychiatric:         Mood and Affect: Mood normal.         Behavior: Behavior normal.           Assessment & Plan  1. Facial paresthesia  -     DULoxetine (CYMBALTA) 30 MG capsule; Take " 1 capsule (30 mg total) by mouth once daily.  Dispense: 30 capsule; Refill: 0. Side effects discussed with patient.     2. Hypothyroidism, unspecified type  -    Refill levothyroxine (SYNTHROID) 150 MCG tablet; Take 1 tablet (150 mcg total) by mouth before breakfast.  Dispense: 90 tablet; Refill: 3    3. Primary insomnia  -     Refill traZODone (DESYREL) 100 MG tablet; Take 1 tablet (100 mg total) by mouth nightly as needed for Insomnia.  Dispense: 90 tablet; Refill: 3    4. Primary hypertension  -     Refill losartan (COZAAR) 50 MG tablet; Take 1 tablet (50 mg total) by mouth once daily.  Dispense: 90 tablet; Refill: 3  -  Blood pressure goal <140/90, recommend DASH diet, record BP at home daily and bring log to next office visit to assure that home cuff is calibrated at minimum every 12 months, continue current medication regimen.    5. Left back pain with negative work up   - Referral sent to breast surgery     RTC in 1 month for follow up .    I spent a total of 60 minutes on the day of the visit.This includes face to face time and non-face to face time preparing to see the patient (eg, review of tests), obtaining and/or reviewing separately obtained history, documenting clinical information in the electronic or other health record, independently interpreting results and communicating results to the patient/family/caregiver, or care coordinator.

## 2024-05-15 ENCOUNTER — TELEPHONE (OUTPATIENT)
Dept: PRIMARY CARE CLINIC | Facility: CLINIC | Age: 79
End: 2024-05-15
Payer: MEDICARE

## 2024-05-15 ENCOUNTER — PATIENT MESSAGE (OUTPATIENT)
Dept: PRIMARY CARE CLINIC | Facility: CLINIC | Age: 79
End: 2024-05-15
Payer: MEDICARE

## 2024-05-15 NOTE — TELEPHONE ENCOUNTER
----- Message from Merari Austin sent at 5/15/2024  8:09 AM CDT -----  Regarding: advice  Who Called: Leigh Townsend    Caller is requesting assistance/information from provider's office.    Symptoms (please be specific): congestion    How long has patient had these symptoms:  2 months  List of preferred pharmacies on file (remove unneeded): [facility]       Patient's Preferred Phone Number on File: [phone]     Additional Information: stated that she had a visit yesterday and wanted to know if she can get something to help with her congestion that she has been having for a couple months. Please advise

## 2024-05-15 NOTE — TELEPHONE ENCOUNTER
----- Message from Merari Austin sent at 5/15/2024  8:09 AM CDT -----  Regarding: advice  Who Called: Leigh Townsend    Caller is requesting assistance/information from provider's office.    Symptoms (please be specific): congestion    How long has patient had these symptoms:  2 months  List of preferred pharmacies on file (remove unneeded): [facility]       Patient's Preferred Phone Number on File: [phone]     Additional Information: stated that she had a visit yesterday and wanted to know if she can get something to help with her congestion that she has been having for a couple months. Please advise

## 2024-05-15 NOTE — TELEPHONE ENCOUNTER
I would recommend Flonase oTC and avoid Mucinex with her elevated BP. We will readdress in June appt

## 2024-05-16 ENCOUNTER — TELEPHONE (OUTPATIENT)
Dept: PRIMARY CARE CLINIC | Facility: CLINIC | Age: 79
End: 2024-05-16
Payer: MEDICARE

## 2024-05-16 DIAGNOSIS — Z85.3 HISTORY OF BREAST CANCER: ICD-10-CM

## 2024-05-16 DIAGNOSIS — M54.2 NECK PAIN ON LEFT SIDE: ICD-10-CM

## 2024-05-16 DIAGNOSIS — M54.6 CHRONIC LEFT-SIDED THORACIC BACK PAIN: Primary | ICD-10-CM

## 2024-05-16 DIAGNOSIS — G89.29 CHRONIC LEFT-SIDED THORACIC BACK PAIN: Primary | ICD-10-CM

## 2024-05-16 NOTE — TELEPHONE ENCOUNTER
Dr Viramontes's office called and stated the referral sent for the left mastectomy is not what Dr Viramontes does. She stated he does breast reconstruction. They would like to know if breast reconstruction needs to be done on patient.

## 2024-05-16 NOTE — TELEPHONE ENCOUNTER
Dr Moreno,   Plastic surgery office stated patient should see a breast surgeon. The breast surgeon's office stated patient needs to see a plastic surgeon. Any suggestions on how to proceed with this referral?

## 2024-05-16 NOTE — TELEPHONE ENCOUNTER
----- Message from Mariia Beard sent at 5/16/2024  3:34 PM CDT -----  .Who Called: [facility]     Caller is requesting assistance/information from provider's office.      Preferred Method of Contact: Phone Call  Patient's Preferred Phone Number on File:[phone] option 2     Best Call Back Number, if different:  Additional Information: Annelise called and stated that the referral will be canceled the physician recommended sending the patient to a plastic surgeon.

## 2024-05-20 ENCOUNTER — PATIENT MESSAGE (OUTPATIENT)
Dept: PRIMARY CARE CLINIC | Facility: CLINIC | Age: 79
End: 2024-05-20
Payer: MEDICARE

## 2024-05-20 ENCOUNTER — TELEPHONE (OUTPATIENT)
Dept: PRIMARY CARE CLINIC | Facility: CLINIC | Age: 79
End: 2024-05-20
Payer: MEDICARE

## 2024-05-20 NOTE — TELEPHONE ENCOUNTER
----- Message from Doretha Ashley sent at 5/17/2024 12:55 PM CDT -----  Regarding: referral  .Type:  Needs Medical Advice    Who Called: pt  Would the patient rather a call back or a response via [facility]?   Best Call Back Number:  [phone]  Additional Information: Re- send referral - Dr. Clint Munoz for breast surgery

## 2024-06-13 ENCOUNTER — OFFICE VISIT (OUTPATIENT)
Dept: PRIMARY CARE CLINIC | Facility: CLINIC | Age: 79
End: 2024-06-13
Payer: MEDICARE

## 2024-06-13 DIAGNOSIS — J44.9 CHRONIC OBSTRUCTIVE PULMONARY DISEASE, UNSPECIFIED COPD TYPE: ICD-10-CM

## 2024-06-13 DIAGNOSIS — R20.2 FACIAL PARESTHESIA: Primary | ICD-10-CM

## 2024-06-13 DIAGNOSIS — F17.210 CIGARETTE SMOKER MOTIVATED TO QUIT: ICD-10-CM

## 2024-06-13 RX ORDER — DULOXETIN HYDROCHLORIDE 60 MG/1
60 CAPSULE, DELAYED RELEASE ORAL DAILY
Qty: 30 CAPSULE | Refills: 5 | Status: SHIPPED | OUTPATIENT
Start: 2024-06-13 | End: 2025-06-13

## 2024-06-13 NOTE — PROGRESS NOTES
Chief Complaint  Chief Complaint   Patient presents with    1 month follow up for facial paresthesia     Nicotine Dependence     Would like to discuss medication to help stop smoking        HPI  Leigh Townsend is a 78 y.o. female with medical diagnoses as listed in the medical history and problem list that presents to clinic to follow up for numbness on her face not related to CVA. She has been on Cymbalta 30mg for a month and reports significant improvement in numbness as well as her mood. Desires dose increase. No side effect reports.   Patient is cigarette smoker and desires to stop smoking (at least half a pack a day since 13 year old) . She is open for meetings or medications.     Health Maintenance         Date Due Completion Date    Hepatitis C Screening Never done ---    DEXA Scan Never done ---    RSV Vaccine (Age 60+ and Pregnant patients) (1 - 1-dose 60+ series) Never done ---    TETANUS VACCINE 11/14/2024 (Originally 6/17/2024) 6/17/2014    Shingles Vaccine (1 of 2) 11/14/2024 (Originally 9/12/1964) ---    COVID-19 Vaccine (3 - Moderna risk series) 11/14/2024 (Originally 4/6/2021) 3/9/2021    Mammogram 09/15/2024 9/15/2023    Lipid Panel 11/14/2028 11/14/2023    Override on 3/9/2020: Done    Colonoscopy 11/01/2033 11/1/2023    Override on 10/8/2019: Done            ALLERGIES AND MEDICATIONS: updated and reviewed.  Review of patient's allergies indicates:   Allergen Reactions    Alendronate Diarrhea     Current Outpatient Medications   Medication Sig Dispense Refill    aspirin (ECOTRIN) 81 MG EC tablet Take 1 tablet by mouth once daily.      levothyroxine (SYNTHROID) 150 MCG tablet Take 1 tablet (150 mcg total) by mouth before breakfast. 90 tablet 3    losartan (COZAAR) 50 MG tablet Take 1 tablet (50 mg total) by mouth once daily. 90 tablet 3    multivitamin with minerals tablet Take 1 tablet by mouth once daily.      traZODone (DESYREL) 100 MG tablet Take 1 tablet (100 mg total) by mouth nightly as needed  "for Insomnia. 90 tablet 3    DULoxetine (CYMBALTA) 60 MG capsule Take 1 capsule (60 mg total) by mouth once daily. 30 capsule 5     No current facility-administered medications for this visit.       Histories are reviewed and updated as appropriate     Review of Systems  Comprehensive review of system performed- negative except noted in HPI       Objective:   Vitals:    06/13/24 1540   BP: 136/86   BP Location: Left arm   Patient Position: Sitting   BP Method: Large (Manual)   Pulse: 92   Resp: 18   Temp: 98.3 °F (36.8 °C)   TempSrc: Oral   SpO2: (!) 9%   Weight: 56.7 kg (125 lb)   Height: 4' 11" (1.499 m)    Body mass index is 25.25 kg/m².  Physical Exam  Vitals and nursing note reviewed.   Constitutional:       General: She is not in acute distress.     Appearance: Normal appearance.   HENT:      Head: Normocephalic and atraumatic.      Mouth/Throat:      Mouth: Mucous membranes are moist.   Eyes:      Extraocular Movements: Extraocular movements intact.      Conjunctiva/sclera: Conjunctivae normal.   Cardiovascular:      Rate and Rhythm: Normal rate.   Pulmonary:      Effort: Pulmonary effort is normal.   Musculoskeletal:         General: Normal range of motion.      Cervical back: Normal range of motion.   Skin:     General: Skin is warm and dry.   Neurological:      General: No focal deficit present.      Mental Status: She is alert and oriented to person, place, and time. Mental status is at baseline.      Sensory: Sensory deficit (chronic- different area in her face with numbness. Motor and CN are intact) present.   Psychiatric:         Mood and Affect: Mood normal.         Behavior: Behavior normal.           Assessment & Plan  1. Facial paresthesia  - Symptoms improve significant with Cymbalta    - Increase to  DULoxetine (CYMBALTA) 60 MG capsule; Take 1 capsule (60 mg total) by mouth once daily.  Dispense: 30 capsule; Refill: 5. Side effects discussed with patient.     2. Chronic obstructive pulmonary " disease, unspecified COPD type  Stable with good O2 sat     3. Cigarette smoker motivated to quit  -     Ambulatory referral/consult to Smoking Cessation Program; Future; Expected date: 06/20/2024    The patient was counseled on the dangers of tobacco use, and was advised to quit and referred to a tobacco cessation program.  Reviewed strategies to maximize success, including removing cigarettes and smoking materials from environment, stress management, and local smoking cessation programs ( ).  I have spent 5 mins to discuss about tobacco cessation     RTC in 3 months for follow up

## 2024-06-14 VITALS
SYSTOLIC BLOOD PRESSURE: 136 MMHG | HEIGHT: 59 IN | HEART RATE: 92 BPM | DIASTOLIC BLOOD PRESSURE: 86 MMHG | WEIGHT: 125 LBS | BODY MASS INDEX: 25.2 KG/M2 | RESPIRATION RATE: 18 BRPM | OXYGEN SATURATION: 98 % | TEMPERATURE: 98 F

## 2024-08-28 DIAGNOSIS — F51.01 PRIMARY INSOMNIA: ICD-10-CM

## 2024-08-28 RX ORDER — TRAZODONE HYDROCHLORIDE 100 MG/1
TABLET ORAL
Qty: 90 TABLET | Refills: 0 | OUTPATIENT
Start: 2024-08-28

## 2024-09-05 ENCOUNTER — PATIENT MESSAGE (OUTPATIENT)
Dept: NEUROLOGY | Facility: CLINIC | Age: 79
End: 2024-09-05
Payer: MEDICARE

## 2024-09-16 ENCOUNTER — OFFICE VISIT (OUTPATIENT)
Dept: PRIMARY CARE CLINIC | Facility: CLINIC | Age: 79
End: 2024-09-16
Payer: MEDICARE

## 2024-09-16 VITALS
HEIGHT: 59 IN | OXYGEN SATURATION: 97 % | RESPIRATION RATE: 18 BRPM | HEART RATE: 81 BPM | DIASTOLIC BLOOD PRESSURE: 81 MMHG | SYSTOLIC BLOOD PRESSURE: 134 MMHG | WEIGHT: 125 LBS | BODY MASS INDEX: 25.2 KG/M2

## 2024-09-16 DIAGNOSIS — R20.2 FACIAL PARESTHESIA: ICD-10-CM

## 2024-09-16 DIAGNOSIS — Z13.6 SCREENING FOR CARDIOVASCULAR CONDITION: ICD-10-CM

## 2024-09-16 DIAGNOSIS — Z00.00 MEDICARE ANNUAL WELLNESS VISIT, SUBSEQUENT: ICD-10-CM

## 2024-09-16 DIAGNOSIS — I10 PRIMARY HYPERTENSION: Primary | ICD-10-CM

## 2024-09-16 PROBLEM — J44.9 CHRONIC OBSTRUCTIVE PULMONARY DISEASE, UNSPECIFIED COPD TYPE: Status: RESOLVED | Noted: 2024-06-13 | Resolved: 2024-09-16

## 2024-09-16 PROCEDURE — 1159F MED LIST DOCD IN RCRD: CPT | Mod: CPTII,,, | Performed by: STUDENT IN AN ORGANIZED HEALTH CARE EDUCATION/TRAINING PROGRAM

## 2024-09-16 PROCEDURE — 99214 OFFICE O/P EST MOD 30 MIN: CPT | Mod: ,,, | Performed by: STUDENT IN AN ORGANIZED HEALTH CARE EDUCATION/TRAINING PROGRAM

## 2024-09-16 PROCEDURE — 3079F DIAST BP 80-89 MM HG: CPT | Mod: CPTII,,, | Performed by: STUDENT IN AN ORGANIZED HEALTH CARE EDUCATION/TRAINING PROGRAM

## 2024-09-16 PROCEDURE — 1160F RVW MEDS BY RX/DR IN RCRD: CPT | Mod: CPTII,,, | Performed by: STUDENT IN AN ORGANIZED HEALTH CARE EDUCATION/TRAINING PROGRAM

## 2024-09-16 PROCEDURE — 3075F SYST BP GE 130 - 139MM HG: CPT | Mod: CPTII,,, | Performed by: STUDENT IN AN ORGANIZED HEALTH CARE EDUCATION/TRAINING PROGRAM

## 2024-09-16 RX ORDER — DULOXETIN HYDROCHLORIDE 60 MG/1
60 CAPSULE, DELAYED RELEASE ORAL DAILY
Qty: 30 CAPSULE | Refills: 5 | Status: SHIPPED | OUTPATIENT
Start: 2024-09-16 | End: 2025-09-16

## 2024-09-16 RX ORDER — PREGABALIN 100 MG/1
100 CAPSULE ORAL NIGHTLY
Qty: 90 CAPSULE | Refills: 0 | Status: SHIPPED | OUTPATIENT
Start: 2024-09-16 | End: 2024-12-15

## 2024-09-16 RX ORDER — DULOXETIN HYDROCHLORIDE 60 MG/1
60 CAPSULE, DELAYED RELEASE ORAL DAILY
Qty: 30 CAPSULE | Refills: 5 | Status: CANCELLED | OUTPATIENT
Start: 2024-09-16 | End: 2025-09-16

## 2024-09-16 NOTE — PROGRESS NOTES
Chief Complaint  Chief Complaint   Patient presents with    Follow-up       HPI  Leigh Townsend is a 79 y.o. female with medical diagnoses as listed in the medical history and problem list that presents to clinic to follow up for facial paresthesia and itching of the scalp. Patient states that Cymbalta 60mg has helped with symptoms but the itch still bothers her a lot at night. Denies any side effects. Patient has tried gabapentin which did not help .  Denies any new complaints today .    Health Maintenance         Date Due Completion Date    Hepatitis C Screening Never done ---    DEXA Scan Never done ---    Influenza Vaccine (1) 09/01/2024 11/14/2023    Mammogram 09/15/2024 9/15/2023    TETANUS VACCINE 11/14/2024 (Originally 6/17/2024) 6/17/2014    Shingles Vaccine (1 of 2) 11/14/2024 (Originally 9/12/1964) ---    COVID-19 Vaccine (3 - Moderna risk series) 11/14/2024 (Originally 4/6/2021) 3/9/2021    Lipid Panel 11/14/2028 11/14/2023    Override on 3/9/2020: Done    Colonoscopy 11/01/2033 11/1/2023    Override on 10/8/2019: Done            ALLERGIES AND MEDICATIONS: updated and reviewed.  Review of patient's allergies indicates:   Allergen Reactions    Alendronate Diarrhea     Current Outpatient Medications   Medication Sig Dispense Refill    aspirin (ECOTRIN) 81 MG EC tablet Take 1 tablet by mouth once daily.      DULoxetine (CYMBALTA) 60 MG capsule Take 1 capsule (60 mg total) by mouth once daily. 30 capsule 5    levothyroxine (SYNTHROID) 150 MCG tablet Take 1 tablet (150 mcg total) by mouth before breakfast. 90 tablet 3    losartan (COZAAR) 50 MG tablet Take 1 tablet (50 mg total) by mouth once daily. 90 tablet 3    multivitamin with minerals tablet Take 1 tablet by mouth once daily.      pregabalin (LYRICA) 100 MG capsule Take 1 capsule (100 mg total) by mouth every evening. 90 capsule 0    traZODone (DESYREL) 100 MG tablet Take 1 tablet (100 mg total) by mouth nightly as needed for Insomnia. 90 tablet 3     No  "current facility-administered medications for this visit.       Histories are reviewed and updated as appropriate     Review of Systems  Comprehensive review of system performed- negative except noted in HPI       Objective:   Vitals:    09/16/24 1331   BP: 134/81   Pulse: 81   Resp: 18   SpO2: 97%   Weight: 56.7 kg (125 lb)   Height: 4' 11" (1.499 m)    Body mass index is 25.25 kg/m².  Physical Exam  Vitals and nursing note reviewed.   Constitutional:       General: She is not in acute distress.     Appearance: Normal appearance.   HENT:      Head: Normocephalic and atraumatic.      Mouth/Throat:      Mouth: Mucous membranes are moist.   Eyes:      Extraocular Movements: Extraocular movements intact.      Conjunctiva/sclera: Conjunctivae normal.   Cardiovascular:      Rate and Rhythm: Normal rate.   Pulmonary:      Effort: Pulmonary effort is normal.   Musculoskeletal:         General: Normal range of motion.      Cervical back: Normal range of motion.   Skin:     General: Skin is warm and dry.   Neurological:      General: No focal deficit present.      Mental Status: She is alert and oriented to person, place, and time. Mental status is at baseline.   Psychiatric:         Mood and Affect: Mood normal.         Behavior: Behavior normal.         Thought Content: Thought content normal.         Judgment: Judgment normal.           Assessment & Plan  1. Facial paresthesia  -     Symptoms are improving.   -     Refill DULoxetine (CYMBALTA) 60 MG capsule; Take 1 capsule (60 mg total) by mouth once daily.  Dispense: 30 capsule; Refill: 5  -     Add pregabalin (LYRICA) 100 MG capsule; Take 1 capsule (100 mg total) by mouth every evening.  Dispense: 90 capsule; Refill: 0. Side effects discussed with patient.        RTC for wellness in 3 months     "

## 2024-12-04 DIAGNOSIS — R20.2 FACIAL PARESTHESIA: ICD-10-CM

## 2024-12-04 RX ORDER — PREGABALIN 100 MG/1
100 CAPSULE ORAL NIGHTLY
Qty: 90 CAPSULE | Refills: 3 | Status: SHIPPED | OUTPATIENT
Start: 2024-12-04 | End: 2025-11-29

## 2024-12-04 NOTE — TELEPHONE ENCOUNTER
----- Message from Gabriela sent at 12/4/2024 11:25 AM CST -----  Contact: [phone]  Requesting an RX refill or new RX.    Is this a refill or new RX: refill 1    RX name and strength (copy/paste from chart):  pregabalin (LYRICA) 100 MG capsule    Is this a 30 day or 90 day RX:     Pharmacy name and phone # (copy/paste from chart):  [facility] - [place] [id number]  Phone: [phone]  Fax: [phone]          The doctors have asked that we provide their patients with the following 2 reminders -- prescription refills can take up to 72 hours, and a friendly reminder that in the future you can use your [facility] account to request refills:

## 2025-01-15 ENCOUNTER — OFFICE VISIT (OUTPATIENT)
Dept: PRIMARY CARE CLINIC | Facility: CLINIC | Age: 80
End: 2025-01-15
Payer: MEDICARE

## 2025-01-15 VITALS
BODY MASS INDEX: 28.02 KG/M2 | HEART RATE: 80 BPM | HEIGHT: 59 IN | WEIGHT: 139 LBS | OXYGEN SATURATION: 98 % | SYSTOLIC BLOOD PRESSURE: 124 MMHG | TEMPERATURE: 97 F | RESPIRATION RATE: 18 BRPM | DIASTOLIC BLOOD PRESSURE: 80 MMHG

## 2025-01-15 DIAGNOSIS — G11.9 CEREBELLAR ATAXIA: ICD-10-CM

## 2025-01-15 DIAGNOSIS — C50.811 MALIGNANT NEOPLASM OF OVERLAPPING SITES OF RIGHT BREAST IN FEMALE, ESTROGEN RECEPTOR NEGATIVE: ICD-10-CM

## 2025-01-15 DIAGNOSIS — E03.9 ACQUIRED HYPOTHYROIDISM: ICD-10-CM

## 2025-01-15 DIAGNOSIS — Z13.6 SCREENING FOR CARDIOVASCULAR CONDITION: ICD-10-CM

## 2025-01-15 DIAGNOSIS — R20.2 FACIAL PARESTHESIA: ICD-10-CM

## 2025-01-15 DIAGNOSIS — Z00.00 MEDICARE ANNUAL WELLNESS VISIT, SUBSEQUENT: Primary | ICD-10-CM

## 2025-01-15 DIAGNOSIS — I10 PRIMARY HYPERTENSION: ICD-10-CM

## 2025-01-15 DIAGNOSIS — Z17.1 MALIGNANT NEOPLASM OF OVERLAPPING SITES OF RIGHT BREAST IN FEMALE, ESTROGEN RECEPTOR NEGATIVE: ICD-10-CM

## 2025-01-15 DIAGNOSIS — G81.90 FACIAL HEMIPARESIS: ICD-10-CM

## 2025-01-15 LAB
ALBUMIN SERPL-MCNC: 4.6 G/DL (ref 3.4–4.8)
ALBUMIN/GLOB SERPL: 1.3 RATIO (ref 1.1–2)
ALP SERPL-CCNC: 121 UNIT/L (ref 40–150)
ALT SERPL-CCNC: 24 UNIT/L (ref 0–55)
ANION GAP SERPL CALC-SCNC: 7 MEQ/L
AST SERPL-CCNC: 20 UNIT/L (ref 5–34)
BASOPHILS # BLD AUTO: 0.03 X10(3)/MCL
BASOPHILS NFR BLD AUTO: 0.3 %
BILIRUB SERPL-MCNC: 0.3 MG/DL
BUN SERPL-MCNC: 15.2 MG/DL (ref 9.8–20.1)
CALCIUM SERPL-MCNC: 10.9 MG/DL (ref 8.4–10.2)
CHLORIDE SERPL-SCNC: 104 MMOL/L (ref 98–107)
CHOLEST SERPL-MCNC: 250 MG/DL
CHOLEST/HDLC SERPL: 3 {RATIO} (ref 0–5)
CO2 SERPL-SCNC: 28 MMOL/L (ref 23–31)
CREAT SERPL-MCNC: 1.03 MG/DL (ref 0.55–1.02)
CREAT/UREA NIT SERPL: 15
EOSINOPHIL # BLD AUTO: 0.04 X10(3)/MCL (ref 0–0.9)
EOSINOPHIL NFR BLD AUTO: 0.5 %
ERYTHROCYTE [DISTWIDTH] IN BLOOD BY AUTOMATED COUNT: 13.6 % (ref 11.5–17)
GFR SERPLBLD CREATININE-BSD FMLA CKD-EPI: 56 ML/MIN/1.73/M2
GLOBULIN SER-MCNC: 3.6 GM/DL (ref 2.4–3.5)
GLUCOSE SERPL-MCNC: 104 MG/DL (ref 82–115)
HCT VFR BLD AUTO: 47 % (ref 37–47)
HDLC SERPL-MCNC: 79 MG/DL (ref 35–60)
HGB BLD-MCNC: 15.2 G/DL (ref 12–16)
IMM GRANULOCYTES # BLD AUTO: 0.03 X10(3)/MCL (ref 0–0.04)
IMM GRANULOCYTES NFR BLD AUTO: 0.3 %
LDLC SERPL CALC-MCNC: 124 MG/DL (ref 50–140)
LYMPHOCYTES # BLD AUTO: 1.97 X10(3)/MCL (ref 0.6–4.6)
LYMPHOCYTES NFR BLD AUTO: 22.8 %
MCH RBC QN AUTO: 29.5 PG (ref 27–31)
MCHC RBC AUTO-ENTMCNC: 32.3 G/DL (ref 33–36)
MCV RBC AUTO: 91.1 FL (ref 80–94)
MONOCYTES # BLD AUTO: 0.68 X10(3)/MCL (ref 0.1–1.3)
MONOCYTES NFR BLD AUTO: 7.9 %
NEUTROPHILS # BLD AUTO: 5.9 X10(3)/MCL (ref 2.1–9.2)
NEUTROPHILS NFR BLD AUTO: 68.2 %
NRBC BLD AUTO-RTO: 0 %
PLATELET # BLD AUTO: 304 X10(3)/MCL (ref 130–400)
PMV BLD AUTO: 9.7 FL (ref 7.4–10.4)
POTASSIUM SERPL-SCNC: 4.7 MMOL/L (ref 3.5–5.1)
PROT SERPL-MCNC: 8.2 GM/DL (ref 5.8–7.6)
RBC # BLD AUTO: 5.16 X10(6)/MCL (ref 4.2–5.4)
SODIUM SERPL-SCNC: 139 MMOL/L (ref 136–145)
TRIGL SERPL-MCNC: 235 MG/DL (ref 37–140)
TSH SERPL-ACNC: 8.89 UIU/ML (ref 0.35–4.94)
VLDLC SERPL CALC-MCNC: 47 MG/DL
WBC # BLD AUTO: 8.65 X10(3)/MCL (ref 4.5–11.5)

## 2025-01-15 PROCEDURE — 36415 COLL VENOUS BLD VENIPUNCTURE: CPT | Performed by: STUDENT IN AN ORGANIZED HEALTH CARE EDUCATION/TRAINING PROGRAM

## 2025-01-15 PROCEDURE — 80053 COMPREHEN METABOLIC PANEL: CPT | Performed by: STUDENT IN AN ORGANIZED HEALTH CARE EDUCATION/TRAINING PROGRAM

## 2025-01-15 PROCEDURE — 85025 COMPLETE CBC W/AUTO DIFF WBC: CPT | Performed by: STUDENT IN AN ORGANIZED HEALTH CARE EDUCATION/TRAINING PROGRAM

## 2025-01-15 PROCEDURE — 80061 LIPID PANEL: CPT | Performed by: STUDENT IN AN ORGANIZED HEALTH CARE EDUCATION/TRAINING PROGRAM

## 2025-01-15 PROCEDURE — 84443 ASSAY THYROID STIM HORMONE: CPT | Performed by: STUDENT IN AN ORGANIZED HEALTH CARE EDUCATION/TRAINING PROGRAM

## 2025-01-15 RX ORDER — PREGABALIN 150 MG/1
150 CAPSULE ORAL NIGHTLY
Qty: 90 CAPSULE | Refills: 3 | Status: SHIPPED | OUTPATIENT
Start: 2025-01-15 | End: 2026-01-10

## 2025-01-15 NOTE — PROGRESS NOTES
Patient ID: [id number]     Chief Complaint: Medicare AWV Follow Up (Labs due, Dexa? Colonoscopy  due )      HPI:     Leigh Townsend is a 79 y.o. female here today for a Medicare Wellness.     Patient presents to the clinic today for wellness examination.  Current and past medical history reviewed.    DEXA: ordered     Vaccinations due. vaccination status reviewed, if due and if desired, vaccinations provided and given     No complaints today. Patient is independent and manages her own household. Denies any falls or feeling sad. She has chronic facial paresthesia that is managed by Lyrica however symptoms are still present. Denies worsening of symptom     Opioid Screening: Patient medication list reviewed, patient is not taking prescription opioids. Patient is not using additional opioids than prescribed. Patient is at low risk of substance abuse based on this opioid use history.   Denies urinary leakage     -------------------------------------    Depression    History of right breast cancer    History of skin cancer in adulthood    Hypertension    Hypothyroid    Stroke        Past Surgical History:   Procedure Laterality Date    breast      BREAST BIOPSY      4 excisional biopsies     SECTION  1968    CHOLECYSTECTOMY      MASTECTOMY Right        Review of patient's allergies indicates:   Allergen Reactions    Alendronate Diarrhea       Outpatient Medications Marked as Taking for the 1/15/25 encounter (Office Visit) with Toya Moreno MD   Medication Sig Dispense Refill    aspirin (ECOTRIN) 81 MG EC tablet Take 1 tablet by mouth once daily.      DULoxetine (CYMBALTA) 60 MG capsule Take 1 capsule (60 mg total) by mouth once daily. 30 capsule 5    levothyroxine (SYNTHROID) 150 MCG tablet Take 1 tablet (150 mcg total) by mouth before breakfast. 90 tablet 3    losartan (COZAAR) 50 MG tablet Take 1 tablet (50 mg total) by mouth once daily. 90 tablet 3    multivitamin with minerals tablet Take 1 tablet  by mouth once daily.      [DISCONTINUED] pregabalin (LYRICA) 100 MG capsule Take 1 capsule (100 mg total) by mouth every evening. 90 capsule 3       Social History     Socioeconomic History    Marital status:     Number of children: 1   Tobacco Use    Smoking status: Every Day     Current packs/day: 0.50     Average packs/day: 0.5 packs/day for 15.0 years (7.5 ttl pk-yrs)     Types: Cigarettes    Smokeless tobacco: Never   Substance and Sexual Activity    Alcohol use: No    Drug use: Not Currently     Types: Benzodiazepines     Comment: Prescribed    Sexual activity: Not Currently     Birth control/protection: Post-menopausal     Comment:      Social Drivers of Health     Financial Resource Strain: Low Risk  (9/11/2024)    Overall Financial Resource Strain (CARDIA)     Difficulty of Paying Living Expenses: Not hard at all   Food Insecurity: No Food Insecurity (9/11/2024)    Hunger Vital Sign     Worried About Running Out of Food in the Last Year: Never true     Ran Out of Food in the Last Year: Never true   Transportation Needs: No Transportation Needs (5/11/2024)    PRAPARE - Transportation     Lack of Transportation (Medical): No     Lack of Transportation (Non-Medical): No   Physical Activity: Unknown (9/11/2024)    Exercise Vital Sign     Days of Exercise per Week: 0 days   Stress: No Stress Concern Present (9/11/2024)    [employer] [place] [employer] - Occupational Stress Questionnaire     Feeling of Stress : Not at all   Housing Stability: Unknown (9/11/2024)    Housing Stability Vital Sign     Unable to Pay for Housing in the Last Year: No        Family History   Problem Relation Name Age of Onset    No Known Problems Father      No Known Problems Mother      Cancer Paternal Grandmother Leslie Edwards         throat cancer    Cancer Paternal Grandfather Kaushik Edwards         prostate cancer    No Known Problems Sister      No Known Problems Brother      Breast cancer Neg Hx      Colon  "cancer Neg Hx      Ovarian cancer Neg Hx          Patient Care Team:  Toya Moreno MD as PCP - General (Family Medicine)  Kayley Garza MD as Consulting Physician (Breast Surgery)       Subjective:     Review of Systems   Constitutional:  Negative for chills, fever and weight loss.   HENT:  Negative for hearing loss.    Eyes:  Negative for blurred vision and double vision.   Respiratory:  Negative for cough, shortness of breath and wheezing.    Cardiovascular:  Negative for chest pain, palpitations and leg swelling.   Gastrointestinal:  Negative for blood in stool, constipation, diarrhea, melena, nausea and vomiting.   Genitourinary:  Negative for dysuria, frequency and urgency.   Musculoskeletal:  Negative for falls.   Neurological:  Negative for dizziness, weakness and headaches.   Psychiatric/Behavioral:  Negative for depression, hallucinations and memory loss.          Patient Reported Health Risk Assessment       Objective:     /80 (BP Location: Left arm, Patient Position: Sitting)   Pulse 80   Temp 97.3 °F (36.3 °C) (Oral)   Resp 18   Ht 4' 11" (1.499 m)   Wt 63 kg (139 lb)   LMP  (LMP Unknown)   SpO2 98%   BMI 28.07 kg/m²     Physical Exam  Vitals and nursing note reviewed.   Constitutional:       General: She is not in acute distress.     Appearance: Normal appearance.   HENT:      Head: Normocephalic and atraumatic.      Mouth/Throat:      Mouth: Mucous membranes are moist.      Pharynx: Oropharynx is clear.   Eyes:      Extraocular Movements: Extraocular movements intact.      Conjunctiva/sclera: Conjunctivae normal.   Cardiovascular:      Rate and Rhythm: Normal rate and regular rhythm.   Pulmonary:      Effort: Pulmonary effort is normal.      Breath sounds: Normal breath sounds.   Abdominal:      General: Abdomen is flat.      Palpations: Abdomen is soft.      Tenderness: There is no abdominal tenderness.   Musculoskeletal:         General: No swelling or deformity. Normal range " of motion.   Skin:     General: Skin is warm and dry.   Neurological:      General: No focal deficit present.      Mental Status: She is alert and oriented to person, place, and time. Mental status is at baseline.      Motor: No weakness.      Gait: Gait normal.   Psychiatric:         Mood and Affect: Mood normal.         Behavior: Behavior normal.               11/14/2023     9:40 AM   Checklist of Activities of Daily Living   Bathing Independent   Dressing Independent   Grooming Independent   Oral Care Independent   Toileting Independent   Transferring Independent   Walking Independent   Climbing Stairs Independent   Eating Independent   Shopping Independent   Cooking Independent   Managing Medications Independent   Using the Phone Independent   Housework Indpendent   Laundry Independent   Driving Independent   Managing Finances Independent         1/15/2025     9:40 AM 6/13/2024     3:30 PM 11/14/2023     9:30 AM 8/15/2023     2:00 PM 5/30/2023     2:30 PM 4/3/2023     1:20 PM 3/27/2023     3:30 PM   Fall Risk Assessment - Outpatient   Mobility Status Ambulatory Ambulatory Ambulatory Ambulatory Ambulatory Ambulatory Ambulatory   Number of falls 0 0 0 0 0 0 0   Identified as fall risk False False False False False False False              Assessment/Plan:     1. Medicare annual wellness visit, subsequent  Overall health status was reviewed   Good health habits reinforced   Labs pending  Appropriate recommendations and preventative care medical information provided, with annual wellness exam encouraged.       2. Facial paresthesia  -     Increase to pregabalin (LYRICA) 150 MG capsule; Take 1 capsule (150 mg total) by mouth every evening.  Dispense: 90 capsule; Refill: 3  -   Side effects discussed with patient.     3. Primary hypertension  Stable on losartan 50mg daily       4. Malignant neoplasm of overlapping sites of right breast in female, estrogen receptor negative  In remission     5. Cerebellar ataxia  See  #2    6. Acquired hypothyroidism  Overview:  Taking levothyroxine 150mg       Medicare Annual Wellness and Personalized Prevention Plan:   Fall Risk + Home Safety + Hearing Impairment + Depression Screen + Opioid and Substance Abuse Screening + Cognitive Impairment Screen + Health Risk Assessment all reviewed.     Health Maintenance Topics with due status: Not Due       Topic Last Completion Date    Colonoscopy 11/01/2023    Lipid Panel 01/15/2025      The patient's Health Maintenance was reviewed and the following appears to be due at this time:   Health Maintenance Due   Topic Date Due    Hepatitis C Screening  Never done    Shingles Vaccine (1 of 2) Never done    DEXA Scan  Never done    COVID-19 Vaccine (3 - Moderna risk series) 04/06/2021    TETANUS VACCINE  06/17/2024    Influenza Vaccine (1) 09/01/2024    Mammogram  09/15/2024     Advance Care Planning     Date: 01/15/2025  Patient did not wish or was not able to name a surrogate decision maker or provide an Advance Care Plan.       RTC in 6 months for follow up. In addition to their scheduled follow up, the patient has also been instructed to follow up on as needed basis.

## 2025-02-13 ENCOUNTER — TELEPHONE (OUTPATIENT)
Dept: PRIMARY CARE CLINIC | Facility: CLINIC | Age: 80
End: 2025-02-13
Payer: MEDICARE

## 2025-02-13 DIAGNOSIS — E03.9 ACQUIRED HYPOTHYROIDISM: Primary | ICD-10-CM

## 2025-02-13 NOTE — TELEPHONE ENCOUNTER
Ms. Abraham is having cosmetic surgery under general anesthesia. She was seen on 1/15/25. Can you check your note and see if we can clear her or if she needs pre-op appointment. (She had labs the same day)

## 2025-02-14 ENCOUNTER — TELEPHONE (OUTPATIENT)
Dept: PRIMARY CARE CLINIC | Facility: CLINIC | Age: 80
End: 2025-02-14
Payer: MEDICARE

## 2025-02-14 PROBLEM — E03.9 HYPOTHYROIDISM: Status: ACTIVE | Noted: 2025-02-14

## 2025-02-14 NOTE — TELEPHONE ENCOUNTER
Ms Abraham has been taking her Thyroid script everyday as advised. Do we need to make any changes?         Daughters address:  [address] [address]    In case we need to send thyroid script to Andrea

## 2025-02-14 NOTE — TELEPHONE ENCOUNTER
Yes we could clear her.   Could you also make sure she takes Synthroid 150mcg daily.  Her TSH is elevated at 8.8

## 2025-03-05 DIAGNOSIS — E03.9 HYPOTHYROIDISM, UNSPECIFIED TYPE: ICD-10-CM

## 2025-03-05 RX ORDER — LEVOTHYROXINE SODIUM 150 UG/1
150 TABLET ORAL
Qty: 90 TABLET | Refills: 3 | Status: SHIPPED | OUTPATIENT
Start: 2025-03-05 | End: 2026-02-28

## 2025-03-12 ENCOUNTER — CLINICAL SUPPORT (OUTPATIENT)
Dept: PRIMARY CARE CLINIC | Facility: CLINIC | Age: 80
End: 2025-03-12
Payer: MEDICARE

## 2025-03-12 DIAGNOSIS — E03.9 ACQUIRED HYPOTHYROIDISM: ICD-10-CM

## 2025-03-12 LAB
T4 FREE SERPL-MCNC: 1.48 NG/DL (ref 0.7–1.48)
TSH SERPL-ACNC: 1.97 UIU/ML (ref 0.35–4.94)

## 2025-03-12 PROCEDURE — 36415 COLL VENOUS BLD VENIPUNCTURE: CPT | Mod: ,,, | Performed by: STUDENT IN AN ORGANIZED HEALTH CARE EDUCATION/TRAINING PROGRAM

## 2025-03-12 PROCEDURE — 84443 ASSAY THYROID STIM HORMONE: CPT | Performed by: STUDENT IN AN ORGANIZED HEALTH CARE EDUCATION/TRAINING PROGRAM

## 2025-03-12 PROCEDURE — 84439 ASSAY OF FREE THYROXINE: CPT | Performed by: STUDENT IN AN ORGANIZED HEALTH CARE EDUCATION/TRAINING PROGRAM

## 2025-03-12 PROCEDURE — 36415 COLL VENOUS BLD VENIPUNCTURE: CPT

## 2025-03-12 NOTE — PROGRESS NOTES
Patient presented today for an Nurse Visit to have blood work drawn. One attempted draw to left arm with a butterfly gauge needle, pt tolerated well with no complaints.

## 2025-03-21 ENCOUNTER — RESULTS FOLLOW-UP (OUTPATIENT)
Dept: PRIMARY CARE CLINIC | Facility: CLINIC | Age: 80
End: 2025-03-21

## 2025-04-07 ENCOUNTER — TELEPHONE (OUTPATIENT)
Dept: PRIMARY CARE CLINIC | Facility: CLINIC | Age: 80
End: 2025-04-07
Payer: MEDICARE

## 2025-04-07 DIAGNOSIS — R20.2 FACIAL PARESTHESIA: ICD-10-CM

## 2025-04-07 RX ORDER — DULOXETIN HYDROCHLORIDE 60 MG/1
60 CAPSULE, DELAYED RELEASE ORAL DAILY
Qty: 30 CAPSULE | Refills: 1 | Status: SHIPPED | OUTPATIENT
Start: 2025-04-07

## 2025-04-07 NOTE — TELEPHONE ENCOUNTER
----- Message from Merari sent at 4/7/2025  9:39 AM CDT -----  Regarding: refill  Who Called: Leigh JamesmardRefill or New Rx:RefillRX Name and Strength:DULoxetine (CYMBALTA) 60 MG capsule How is the patient currently taking it? (ex. 1XDay):Is this a 30 day or 90 day RX:Local or Mail Order:List of preferred pharmacies on file (remove unneeded): [id number] - [facility] [address] AT [facility] different Pharmacy is requested, enter Pharmacy information here including location and phone number:  Ordering Provider:Preferred Method of Contact: Phone CallPatient's Preferred Phone Number on File: [phone] Best Call Back Number, if different:Additional Information: stated that she only has has 2 tablets left. Stated that she is needing a a partial rx sent to the pharmacy while she waits for the mail

## 2025-05-07 DIAGNOSIS — R20.2 FACIAL PARESTHESIA: Primary | ICD-10-CM

## 2025-05-07 RX ORDER — DULOXETIN HYDROCHLORIDE 60 MG/1
60 CAPSULE, DELAYED RELEASE ORAL DAILY
Qty: 30 CAPSULE | Refills: 1 | Status: SHIPPED | OUTPATIENT
Start: 2025-05-07

## 2025-05-07 NOTE — TELEPHONE ENCOUNTER
Copied from CRM #[id number]. Topic: Medications - Medication Refill  >> May 7, 2025  2:50 PM Merari wrote:  Who Called: Leigh Townsend    Refill or New Rx:Refill  RX Name and Strength:  DULoxetine (CYMBALTA) 60 MG capsule    How is the patient currently taking it? (ex. 1XDay):  Is this a 30 day or 90 day RX:  Local or Mail Order:  List of preferred pharmacies on file (remove unneeded): [facility] - [place] [id number]  If different Pharmacy is requested, enter Pharmacy information here including location and phone number:    Ordering Provider:      Preferred Method of Contact: Phone Call  Patient's Preferred Phone Number on File: [phone]   Best Call Back Number, if different:  Additional Information:

## 2025-05-30 ENCOUNTER — TELEPHONE (OUTPATIENT)
Dept: PHARMACY | Facility: CLINIC | Age: 80
End: 2025-05-30
Payer: MEDICARE

## 2025-05-30 NOTE — TELEPHONE ENCOUNTER
[facility]/Population Health Chart Review & Patient Outreach Details For Medication Adherence Project    Reason for Outreach Encounter: 3rd Party payor non-compliance report ([employer], [employer], [employer], etc)  2.  Patient Outreach Method: Reviewed patient chart  and Placely message  3.   Medication in question:    Hypertension Medications              losartan (COZAAR) 50 MG tablet Take 1 tablet (50 mg total) by mouth once daily.                LF 90 ds 12/30/24    4.  Reviewed and or Updates Made To: Patient Chart  5. Outreach Outcomes and/or actions taken: Sent inquiry to patient: Waiting for response  Additional Notes:

## 2025-06-30 DIAGNOSIS — I10 PRIMARY HYPERTENSION: ICD-10-CM

## 2025-06-30 DIAGNOSIS — R20.2 FACIAL PARESTHESIA: ICD-10-CM

## 2025-06-30 RX ORDER — DULOXETIN HYDROCHLORIDE 60 MG/1
60 CAPSULE, DELAYED RELEASE ORAL
Qty: 60 CAPSULE | Refills: 5 | Status: SHIPPED | OUTPATIENT
Start: 2025-06-30

## 2025-06-30 RX ORDER — LOSARTAN POTASSIUM 50 MG/1
50 TABLET ORAL
Qty: 90 TABLET | Refills: 3 | Status: SHIPPED | OUTPATIENT
Start: 2025-06-30

## (undated) DEVICE — GAUZE FLUFF XXLG 36X36 2 PLY

## (undated) DEVICE — DRESSING XEROFORM FOIL PK 1X8

## (undated) DEVICE — ELECTRODE REM PLYHSV RETURN 9

## (undated) DEVICE — SUT VICRYL 3-0 27 SH

## (undated) DEVICE — SUT SILK 3-0 SH 18IN BLACK

## (undated) DEVICE — SEE MEDLINE ITEM 146417

## (undated) DEVICE — COVER PROBE NL STRL 3.6X96IN

## (undated) DEVICE — PACK UNIVERSAL SPLIT II

## (undated) DEVICE — DRAPE STERI INSTRUMENT 1018

## (undated) DEVICE — ADHESIVE DERMABOND ADVANCED

## (undated) DEVICE — STAPLER SKIN REGULAR

## (undated) DEVICE — SUT ETHILON 2-0 PSLX 30IN

## (undated) DEVICE — NEOGUARD COVER 4X30CM STERILE

## (undated) DEVICE — SYR DISP LL 5CC

## (undated) DEVICE — BLADE 4IN EDGE INSULATED

## (undated) DEVICE — SUT CTD VICRYL 3-0 CR/SH

## (undated) DEVICE — SPONGE LAP 18X18 PREWASHED

## (undated) DEVICE — SUT 3/0 27IN PDS II VIO MO

## (undated) DEVICE — STOCKINET 4INX48

## (undated) DEVICE — SEE MEDLINE ITEM 152572

## (undated) DEVICE — TRAY MINOR GEN SURG

## (undated) DEVICE — GAUZE SPONGE 4X4 12PLY

## (undated) DEVICE — NDL 18GA X1 1/2 REG BEVEL

## (undated) DEVICE — SUT 2-0 VICRYL / SH (J417)

## (undated) DEVICE — SUT VICRYL PLUS 4-0 P3 18IN

## (undated) DEVICE — ELECTRODE BLADE INSULATED 1 IN

## (undated) DEVICE — SEE MEDLINE ITEM 157128

## (undated) DEVICE — CUP MEDICINE STERILE 2OZ

## (undated) DEVICE — STOCKINETTE 3INX36

## (undated) DEVICE — COVERS PROBE NR-48 STERILE